# Patient Record
Sex: FEMALE | Race: WHITE | NOT HISPANIC OR LATINO
[De-identification: names, ages, dates, MRNs, and addresses within clinical notes are randomized per-mention and may not be internally consistent; named-entity substitution may affect disease eponyms.]

---

## 2017-02-20 ENCOUNTER — RECORDS - HEALTHEAST (OUTPATIENT)
Dept: ADMINISTRATIVE | Facility: OTHER | Age: 52
End: 2017-02-20

## 2017-04-12 ENCOUNTER — COMMUNICATION - HEALTHEAST (OUTPATIENT)
Dept: ADMINISTRATIVE | Facility: HOSPITAL | Age: 52
End: 2017-04-12

## 2017-04-20 ENCOUNTER — RECORDS - HEALTHEAST (OUTPATIENT)
Dept: ADMINISTRATIVE | Facility: OTHER | Age: 52
End: 2017-04-20

## 2017-04-21 ENCOUNTER — HOSPITAL ENCOUNTER (OUTPATIENT)
Dept: CT IMAGING | Facility: HOSPITAL | Age: 52
Setting detail: RADIATION/ONCOLOGY SERIES
Discharge: STILL ADMITTED OR ADMINISTRATIVE | End: 2017-04-21
Attending: INTERNAL MEDICINE

## 2017-04-21 DIAGNOSIS — C80.1 ADENOCARCINOID TUMOR (H): ICD-10-CM

## 2017-05-01 ENCOUNTER — OFFICE VISIT - HEALTHEAST (OUTPATIENT)
Dept: ONCOLOGY | Facility: CLINIC | Age: 52
End: 2017-05-01

## 2017-05-01 ENCOUNTER — AMBULATORY - HEALTHEAST (OUTPATIENT)
Dept: INFUSION THERAPY | Facility: CLINIC | Age: 52
End: 2017-05-01

## 2017-05-01 DIAGNOSIS — C80.1 ADENOCARCINOID TUMOR (H): ICD-10-CM

## 2017-05-01 ASSESSMENT — MIFFLIN-ST. JEOR: SCORE: 1350.2

## 2017-05-15 ENCOUNTER — AMBULATORY - HEALTHEAST (OUTPATIENT)
Dept: ONCOLOGY | Facility: CLINIC | Age: 52
End: 2017-05-15

## 2017-05-15 ENCOUNTER — COMMUNICATION - HEALTHEAST (OUTPATIENT)
Dept: ADMINISTRATIVE | Facility: HOSPITAL | Age: 52
End: 2017-05-15

## 2017-05-15 DIAGNOSIS — C80.1 ADENOCARCINOID TUMOR (H): ICD-10-CM

## 2017-05-19 ENCOUNTER — AMBULATORY - HEALTHEAST (OUTPATIENT)
Dept: INFUSION THERAPY | Facility: HOSPITAL | Age: 52
End: 2017-05-19

## 2017-05-19 DIAGNOSIS — C80.1 ADENOCARCINOID TUMOR (H): ICD-10-CM

## 2017-05-20 LAB — CHROMOGRANIN A, S - HISTORICAL: 86 NG/ML

## 2017-05-24 ENCOUNTER — COMMUNICATION - HEALTHEAST (OUTPATIENT)
Dept: ONCOLOGY | Facility: HOSPITAL | Age: 52
End: 2017-05-24

## 2017-11-01 ENCOUNTER — HOSPITAL ENCOUNTER (OUTPATIENT)
Dept: CT IMAGING | Facility: HOSPITAL | Age: 52
Discharge: HOME | End: 2017-11-01
Attending: INTERNAL MEDICINE

## 2017-11-01 ENCOUNTER — AMBULATORY - HEALTHEAST (OUTPATIENT)
Dept: ONCOLOGY | Facility: HOSPITAL | Age: 52
End: 2017-11-01

## 2017-11-01 DIAGNOSIS — C80.1 ADENOCARCINOID TUMOR (H): ICD-10-CM

## 2017-11-03 ENCOUNTER — OFFICE VISIT - HEALTHEAST (OUTPATIENT)
Dept: ONCOLOGY | Facility: HOSPITAL | Age: 52
End: 2017-11-03

## 2017-11-03 ENCOUNTER — AMBULATORY - HEALTHEAST (OUTPATIENT)
Dept: INFUSION THERAPY | Facility: HOSPITAL | Age: 52
End: 2017-11-03

## 2017-11-03 DIAGNOSIS — C80.1 ADENOCARCINOID TUMOR (H): ICD-10-CM

## 2017-11-06 ENCOUNTER — COMMUNICATION - HEALTHEAST (OUTPATIENT)
Dept: ONCOLOGY | Facility: HOSPITAL | Age: 52
End: 2017-11-06

## 2017-11-06 LAB — CHROMOGRANIN A, S - HISTORICAL: 109 NG/ML

## 2017-11-07 ENCOUNTER — COMMUNICATION - HEALTHEAST (OUTPATIENT)
Dept: ONCOLOGY | Facility: HOSPITAL | Age: 52
End: 2017-11-07

## 2017-11-07 DIAGNOSIS — C80.1 ADENOCARCINOID TUMOR (H): ICD-10-CM

## 2018-02-01 ENCOUNTER — COMMUNICATION - HEALTHEAST (OUTPATIENT)
Dept: ONCOLOGY | Facility: HOSPITAL | Age: 53
End: 2018-02-01

## 2018-02-22 ENCOUNTER — AMBULATORY - HEALTHEAST (OUTPATIENT)
Dept: INFUSION THERAPY | Facility: HOSPITAL | Age: 53
End: 2018-02-22

## 2018-02-22 DIAGNOSIS — C80.1 ADENOCARCINOID TUMOR (H): ICD-10-CM

## 2018-02-26 ENCOUNTER — COMMUNICATION - HEALTHEAST (OUTPATIENT)
Dept: ONCOLOGY | Facility: HOSPITAL | Age: 53
End: 2018-02-26

## 2018-02-26 LAB — CHROMOGRANIN A - HISTORICAL: 137 NG/ML (ref 0–95)

## 2018-02-27 ENCOUNTER — AMBULATORY - HEALTHEAST (OUTPATIENT)
Dept: ONCOLOGY | Facility: HOSPITAL | Age: 53
End: 2018-02-27

## 2018-02-27 DIAGNOSIS — C80.1 ADENOCARCINOID TUMOR (H): ICD-10-CM

## 2018-02-28 ENCOUNTER — COMMUNICATION - HEALTHEAST (OUTPATIENT)
Dept: ONCOLOGY | Facility: HOSPITAL | Age: 53
End: 2018-02-28

## 2018-03-01 ENCOUNTER — COMMUNICATION - HEALTHEAST (OUTPATIENT)
Dept: ONCOLOGY | Facility: HOSPITAL | Age: 53
End: 2018-03-01

## 2018-03-01 ENCOUNTER — HOSPITAL ENCOUNTER (OUTPATIENT)
Dept: CT IMAGING | Facility: HOSPITAL | Age: 53
Setting detail: RADIATION/ONCOLOGY SERIES
Discharge: STILL ADMITTED OR ADMINISTRATIVE | End: 2018-03-01
Attending: INTERNAL MEDICINE

## 2018-03-01 DIAGNOSIS — C80.1 ADENOCARCINOID TUMOR (H): ICD-10-CM

## 2018-03-09 ENCOUNTER — HOSPITAL ENCOUNTER (OUTPATIENT)
Dept: PET IMAGING | Facility: HOSPITAL | Age: 53
Setting detail: RADIATION/ONCOLOGY SERIES
Discharge: STILL ADMITTED OR ADMINISTRATIVE | End: 2018-03-09
Attending: INTERNAL MEDICINE

## 2018-03-09 ENCOUNTER — COMMUNICATION - HEALTHEAST (OUTPATIENT)
Dept: ONCOLOGY | Facility: HOSPITAL | Age: 53
End: 2018-03-09

## 2018-03-09 DIAGNOSIS — C80.1 ADENOCARCINOID TUMOR (H): ICD-10-CM

## 2018-03-09 LAB — GLUCOSE BLDC GLUCOMTR-MCNC: 82 MG/DL

## 2018-03-20 ENCOUNTER — COMMUNICATION - HEALTHEAST (OUTPATIENT)
Dept: ONCOLOGY | Facility: HOSPITAL | Age: 53
End: 2018-03-20

## 2018-05-01 ENCOUNTER — AMBULATORY - HEALTHEAST (OUTPATIENT)
Dept: INFUSION THERAPY | Facility: HOSPITAL | Age: 53
End: 2018-05-01

## 2018-05-01 DIAGNOSIS — C80.1 ADENOCARCINOID TUMOR (H): ICD-10-CM

## 2018-05-01 LAB
ALBUMIN SERPL-MCNC: 4 G/DL (ref 3.5–5)
ALP SERPL-CCNC: 65 U/L (ref 45–120)
ALT SERPL W P-5'-P-CCNC: 20 U/L (ref 0–45)
ANION GAP SERPL CALCULATED.3IONS-SCNC: 12 MMOL/L (ref 5–18)
AST SERPL W P-5'-P-CCNC: 23 U/L (ref 0–40)
BASOPHILS # BLD AUTO: 0 THOU/UL (ref 0–0.2)
BASOPHILS NFR BLD AUTO: 1 % (ref 0–2)
BILIRUB SERPL-MCNC: 0.4 MG/DL (ref 0–1)
BUN SERPL-MCNC: 18 MG/DL (ref 8–22)
CALCIUM SERPL-MCNC: 9.5 MG/DL (ref 8.5–10.5)
CHLORIDE BLD-SCNC: 105 MMOL/L (ref 98–107)
CO2 SERPL-SCNC: 23 MMOL/L (ref 22–31)
CREAT SERPL-MCNC: 0.76 MG/DL (ref 0.6–1.1)
EOSINOPHIL # BLD AUTO: 0.1 THOU/UL (ref 0–0.4)
EOSINOPHIL NFR BLD AUTO: 1 % (ref 0–6)
ERYTHROCYTE [DISTWIDTH] IN BLOOD BY AUTOMATED COUNT: 13.7 % (ref 11–14.5)
GFR SERPL CREATININE-BSD FRML MDRD: >60 ML/MIN/1.73M2
GLUCOSE BLD-MCNC: 109 MG/DL (ref 70–125)
HCT VFR BLD AUTO: 38.3 % (ref 35–47)
HGB BLD-MCNC: 13.6 G/DL (ref 12–16)
LYMPHOCYTES # BLD AUTO: 1.6 THOU/UL (ref 0.8–4.4)
LYMPHOCYTES NFR BLD AUTO: 32 % (ref 20–40)
MCH RBC QN AUTO: 33 PG (ref 27–34)
MCHC RBC AUTO-ENTMCNC: 35.5 G/DL (ref 32–36)
MCV RBC AUTO: 93 FL (ref 80–100)
MONOCYTES # BLD AUTO: 0.5 THOU/UL (ref 0–0.9)
MONOCYTES NFR BLD AUTO: 9 % (ref 2–10)
NEUTROPHILS # BLD AUTO: 3 THOU/UL (ref 2–7.7)
NEUTROPHILS NFR BLD AUTO: 57 % (ref 50–70)
PLATELET # BLD AUTO: 179 THOU/UL (ref 140–440)
PMV BLD AUTO: 12 FL (ref 8.5–12.5)
POTASSIUM BLD-SCNC: 4.1 MMOL/L (ref 3.5–5)
PROT SERPL-MCNC: 7.8 G/DL (ref 6–8)
RBC # BLD AUTO: 4.12 MILL/UL (ref 3.8–5.4)
SODIUM SERPL-SCNC: 140 MMOL/L (ref 136–145)
WBC: 5.2 THOU/UL (ref 4–11)

## 2018-05-04 ENCOUNTER — OFFICE VISIT - HEALTHEAST (OUTPATIENT)
Dept: ONCOLOGY | Facility: HOSPITAL | Age: 53
End: 2018-05-04

## 2018-05-04 DIAGNOSIS — C80.1 ADENOCARCINOID TUMOR (H): ICD-10-CM

## 2018-05-04 LAB — CHROMOGRANIN A - HISTORICAL: 103 NG/ML (ref 0–95)

## 2018-05-04 RX ORDER — MULTIPLE VITAMINS W/ MINERALS TAB 9MG-400MCG
2 TAB ORAL DAILY
Status: SHIPPED | COMMUNITY
Start: 2018-04-01

## 2018-05-07 ENCOUNTER — COMMUNICATION - HEALTHEAST (OUTPATIENT)
Dept: ONCOLOGY | Facility: HOSPITAL | Age: 53
End: 2018-05-07

## 2018-09-27 ENCOUNTER — COMMUNICATION - HEALTHEAST (OUTPATIENT)
Dept: ONCOLOGY | Facility: HOSPITAL | Age: 53
End: 2018-09-27

## 2018-10-08 ENCOUNTER — HOSPITAL ENCOUNTER (OUTPATIENT)
Dept: CT IMAGING | Facility: HOSPITAL | Age: 53
Setting detail: RADIATION/ONCOLOGY SERIES
Discharge: STILL ADMITTED OR ADMINISTRATIVE | End: 2018-10-08
Attending: INTERNAL MEDICINE

## 2018-10-08 DIAGNOSIS — C80.1 ADENOCARCINOID TUMOR (H): ICD-10-CM

## 2018-10-10 ENCOUNTER — AMBULATORY - HEALTHEAST (OUTPATIENT)
Dept: INFUSION THERAPY | Facility: HOSPITAL | Age: 53
End: 2018-10-10

## 2018-10-10 ENCOUNTER — OFFICE VISIT - HEALTHEAST (OUTPATIENT)
Dept: ONCOLOGY | Facility: HOSPITAL | Age: 53
End: 2018-10-10

## 2018-10-10 DIAGNOSIS — C80.1 ADENOCARCINOID TUMOR (H): ICD-10-CM

## 2018-10-10 DIAGNOSIS — D3A.00 CARCINOID TUMOR (H): ICD-10-CM

## 2018-10-10 LAB
ALBUMIN SERPL-MCNC: 4 G/DL (ref 3.5–5)
ALP SERPL-CCNC: 70 U/L (ref 45–120)
ALT SERPL W P-5'-P-CCNC: 23 U/L (ref 0–45)
ANION GAP SERPL CALCULATED.3IONS-SCNC: 11 MMOL/L (ref 5–18)
AST SERPL W P-5'-P-CCNC: 17 U/L (ref 0–40)
BASOPHILS # BLD AUTO: 0 THOU/UL (ref 0–0.2)
BASOPHILS NFR BLD AUTO: 1 % (ref 0–2)
BILIRUB SERPL-MCNC: 0.3 MG/DL (ref 0–1)
BUN SERPL-MCNC: 15 MG/DL (ref 8–22)
CALCIUM SERPL-MCNC: 9.7 MG/DL (ref 8.5–10.5)
CHLORIDE BLD-SCNC: 103 MMOL/L (ref 98–107)
CO2 SERPL-SCNC: 29 MMOL/L (ref 22–31)
CREAT SERPL-MCNC: 0.79 MG/DL (ref 0.6–1.1)
EOSINOPHIL # BLD AUTO: 0.1 THOU/UL (ref 0–0.4)
EOSINOPHIL NFR BLD AUTO: 3 % (ref 0–6)
ERYTHROCYTE [DISTWIDTH] IN BLOOD BY AUTOMATED COUNT: 13.2 % (ref 11–14.5)
GFR SERPL CREATININE-BSD FRML MDRD: >60 ML/MIN/1.73M2
GLUCOSE BLD-MCNC: 138 MG/DL (ref 70–125)
HCT VFR BLD AUTO: 42 % (ref 35–47)
HGB BLD-MCNC: 15 G/DL (ref 12–16)
LYMPHOCYTES # BLD AUTO: 1.6 THOU/UL (ref 0.8–4.4)
LYMPHOCYTES NFR BLD AUTO: 39 % (ref 20–40)
MCH RBC QN AUTO: 33.2 PG (ref 27–34)
MCHC RBC AUTO-ENTMCNC: 35.7 G/DL (ref 32–36)
MCV RBC AUTO: 93 FL (ref 80–100)
MONOCYTES # BLD AUTO: 0.3 THOU/UL (ref 0–0.9)
MONOCYTES NFR BLD AUTO: 7 % (ref 2–10)
NEUTROPHILS # BLD AUTO: 2.1 THOU/UL (ref 2–7.7)
NEUTROPHILS NFR BLD AUTO: 51 % (ref 50–70)
PLATELET # BLD AUTO: 136 THOU/UL (ref 140–440)
PMV BLD AUTO: 12.1 FL (ref 8.5–12.5)
POTASSIUM BLD-SCNC: 3.6 MMOL/L (ref 3.5–5)
PROT SERPL-MCNC: 7.5 G/DL (ref 6–8)
RBC # BLD AUTO: 4.52 MILL/UL (ref 3.8–5.4)
SODIUM SERPL-SCNC: 143 MMOL/L (ref 136–145)
WBC: 4.1 THOU/UL (ref 4–11)

## 2018-10-12 LAB — CHROMOGRANIN A - HISTORICAL: 129 NG/ML (ref 0–95)

## 2019-02-19 ENCOUNTER — COMMUNICATION - HEALTHEAST (OUTPATIENT)
Dept: ONCOLOGY | Facility: HOSPITAL | Age: 54
End: 2019-02-19

## 2019-03-12 ENCOUNTER — COMMUNICATION - HEALTHEAST (OUTPATIENT)
Dept: ONCOLOGY | Facility: HOSPITAL | Age: 54
End: 2019-03-12

## 2019-03-27 ENCOUNTER — COMMUNICATION - HEALTHEAST (OUTPATIENT)
Dept: ONCOLOGY | Facility: HOSPITAL | Age: 54
End: 2019-03-27

## 2019-04-24 ENCOUNTER — HOSPITAL ENCOUNTER (OUTPATIENT)
Dept: CT IMAGING | Facility: HOSPITAL | Age: 54
Setting detail: RADIATION/ONCOLOGY SERIES
Discharge: STILL ADMITTED OR ADMINISTRATIVE | End: 2019-04-24
Attending: INTERNAL MEDICINE

## 2019-04-24 DIAGNOSIS — D3A.00 CARCINOID TUMOR (H): ICD-10-CM

## 2019-04-26 ENCOUNTER — AMBULATORY - HEALTHEAST (OUTPATIENT)
Dept: INFUSION THERAPY | Facility: HOSPITAL | Age: 54
End: 2019-04-26

## 2019-04-26 ENCOUNTER — OFFICE VISIT - HEALTHEAST (OUTPATIENT)
Dept: ONCOLOGY | Facility: HOSPITAL | Age: 54
End: 2019-04-26

## 2019-04-26 DIAGNOSIS — C80.1 ADENOCARCINOID TUMOR (H): ICD-10-CM

## 2019-04-26 DIAGNOSIS — D3A.00 CARCINOID TUMOR (H): ICD-10-CM

## 2019-04-26 LAB
ALBUMIN SERPL-MCNC: 4.3 G/DL (ref 3.5–5)
ALP SERPL-CCNC: 60 U/L (ref 45–120)
ALT SERPL W P-5'-P-CCNC: 27 U/L (ref 0–45)
ANION GAP SERPL CALCULATED.3IONS-SCNC: 9 MMOL/L (ref 5–18)
AST SERPL W P-5'-P-CCNC: 18 U/L (ref 0–40)
BASOPHILS # BLD AUTO: 0 THOU/UL (ref 0–0.2)
BASOPHILS NFR BLD AUTO: 1 % (ref 0–2)
BILIRUB SERPL-MCNC: 0.4 MG/DL (ref 0–1)
BUN SERPL-MCNC: 17 MG/DL (ref 8–22)
CALCIUM SERPL-MCNC: 10.3 MG/DL (ref 8.5–10.5)
CHLORIDE BLD-SCNC: 105 MMOL/L (ref 98–107)
CO2 SERPL-SCNC: 26 MMOL/L (ref 22–31)
CREAT SERPL-MCNC: 0.79 MG/DL (ref 0.6–1.1)
EOSINOPHIL # BLD AUTO: 0.2 THOU/UL (ref 0–0.4)
EOSINOPHIL NFR BLD AUTO: 4 % (ref 0–6)
ERYTHROCYTE [DISTWIDTH] IN BLOOD BY AUTOMATED COUNT: 13 % (ref 11–14.5)
GFR SERPL CREATININE-BSD FRML MDRD: >60 ML/MIN/1.73M2
GLUCOSE BLD-MCNC: 97 MG/DL (ref 70–125)
HCT VFR BLD AUTO: 43.2 % (ref 35–47)
HGB BLD-MCNC: 15.2 G/DL (ref 12–16)
LYMPHOCYTES # BLD AUTO: 1.5 THOU/UL (ref 0.8–4.4)
LYMPHOCYTES NFR BLD AUTO: 35 % (ref 20–40)
MCH RBC QN AUTO: 32.1 PG (ref 27–34)
MCHC RBC AUTO-ENTMCNC: 35.2 G/DL (ref 32–36)
MCV RBC AUTO: 91 FL (ref 80–100)
MONOCYTES # BLD AUTO: 0.4 THOU/UL (ref 0–0.9)
MONOCYTES NFR BLD AUTO: 10 % (ref 2–10)
NEUTROPHILS # BLD AUTO: 2.2 THOU/UL (ref 2–7.7)
NEUTROPHILS NFR BLD AUTO: 51 % (ref 50–70)
PLATELET # BLD AUTO: 166 THOU/UL (ref 140–440)
PMV BLD AUTO: 12.1 FL (ref 8.5–12.5)
POTASSIUM BLD-SCNC: 4.3 MMOL/L (ref 3.5–5)
PROT SERPL-MCNC: 7.7 G/DL (ref 6–8)
RBC # BLD AUTO: 4.74 MILL/UL (ref 3.8–5.4)
SODIUM SERPL-SCNC: 140 MMOL/L (ref 136–145)
WBC: 4.2 THOU/UL (ref 4–11)

## 2019-10-30 ENCOUNTER — COMMUNICATION - HEALTHEAST (OUTPATIENT)
Dept: ADMINISTRATIVE | Facility: HOSPITAL | Age: 54
End: 2019-10-30

## 2019-12-02 ENCOUNTER — HOSPITAL ENCOUNTER (OUTPATIENT)
Dept: CT IMAGING | Facility: HOSPITAL | Age: 54
Discharge: HOME | End: 2019-12-02
Attending: INTERNAL MEDICINE

## 2019-12-02 DIAGNOSIS — C80.1 ADENOCARCINOID TUMOR (H): ICD-10-CM

## 2019-12-04 ENCOUNTER — OFFICE VISIT - HEALTHEAST (OUTPATIENT)
Dept: ONCOLOGY | Facility: HOSPITAL | Age: 54
End: 2019-12-04

## 2019-12-04 ENCOUNTER — AMBULATORY - HEALTHEAST (OUTPATIENT)
Dept: INFUSION THERAPY | Facility: HOSPITAL | Age: 54
End: 2019-12-04

## 2019-12-04 DIAGNOSIS — C80.1 ADENOCARCINOID TUMOR (H): ICD-10-CM

## 2019-12-04 LAB
ALBUMIN SERPL-MCNC: 4.1 G/DL (ref 3.5–5)
ALP SERPL-CCNC: 60 U/L (ref 45–120)
ALT SERPL W P-5'-P-CCNC: 27 U/L (ref 0–45)
ANION GAP SERPL CALCULATED.3IONS-SCNC: 7 MMOL/L (ref 5–18)
AST SERPL W P-5'-P-CCNC: 20 U/L (ref 0–40)
BASOPHILS # BLD AUTO: 0 THOU/UL (ref 0–0.2)
BASOPHILS NFR BLD AUTO: 0 % (ref 0–2)
BILIRUB SERPL-MCNC: 0.3 MG/DL (ref 0–1)
BUN SERPL-MCNC: 18 MG/DL (ref 8–22)
CALCIUM SERPL-MCNC: 9.6 MG/DL (ref 8.5–10.5)
CHLORIDE BLD-SCNC: 104 MMOL/L (ref 98–107)
CO2 SERPL-SCNC: 30 MMOL/L (ref 22–31)
CREAT SERPL-MCNC: 0.86 MG/DL (ref 0.6–1.1)
EOSINOPHIL # BLD AUTO: 0.2 THOU/UL (ref 0–0.4)
EOSINOPHIL NFR BLD AUTO: 3 % (ref 0–6)
ERYTHROCYTE [DISTWIDTH] IN BLOOD BY AUTOMATED COUNT: 13.7 % (ref 11–14.5)
GFR SERPL CREATININE-BSD FRML MDRD: >60 ML/MIN/1.73M2
GLUCOSE BLD-MCNC: 97 MG/DL (ref 70–125)
HCT VFR BLD AUTO: 41.2 % (ref 35–47)
HGB BLD-MCNC: 14.1 G/DL (ref 12–16)
LYMPHOCYTES # BLD AUTO: 1.6 THOU/UL (ref 0.8–4.4)
LYMPHOCYTES NFR BLD AUTO: 33 % (ref 20–40)
MCH RBC QN AUTO: 32.7 PG (ref 27–34)
MCHC RBC AUTO-ENTMCNC: 34.2 G/DL (ref 32–36)
MCV RBC AUTO: 96 FL (ref 80–100)
MONOCYTES # BLD AUTO: 0.6 THOU/UL (ref 0–0.9)
MONOCYTES NFR BLD AUTO: 13 % (ref 2–10)
NEUTROPHILS # BLD AUTO: 2.5 THOU/UL (ref 2–7.7)
NEUTROPHILS NFR BLD AUTO: 51 % (ref 50–70)
PLATELET # BLD AUTO: 161 THOU/UL (ref 140–440)
PMV BLD AUTO: 12 FL (ref 8.5–12.5)
POTASSIUM BLD-SCNC: 4.3 MMOL/L (ref 3.5–5)
PROT SERPL-MCNC: 7.6 G/DL (ref 6–8)
RBC # BLD AUTO: 4.31 MILL/UL (ref 3.8–5.4)
SODIUM SERPL-SCNC: 141 MMOL/L (ref 136–145)
WBC: 4.9 THOU/UL (ref 4–11)

## 2019-12-04 ASSESSMENT — MIFFLIN-ST. JEOR: SCORE: 1443.18

## 2020-04-20 ENCOUNTER — RECORDS - HEALTHEAST (OUTPATIENT)
Dept: ADMINISTRATIVE | Facility: OTHER | Age: 55
End: 2020-04-20

## 2020-08-10 ENCOUNTER — HOSPITAL ENCOUNTER (OUTPATIENT)
Dept: CT IMAGING | Facility: HOSPITAL | Age: 55
Discharge: HOME | End: 2020-08-10

## 2020-08-10 DIAGNOSIS — C80.1 ADENOCARCINOID TUMOR (H): ICD-10-CM

## 2020-08-12 ENCOUNTER — OFFICE VISIT - HEALTHEAST (OUTPATIENT)
Dept: ONCOLOGY | Facility: HOSPITAL | Age: 55
End: 2020-08-12

## 2020-08-12 ENCOUNTER — AMBULATORY - HEALTHEAST (OUTPATIENT)
Dept: INFUSION THERAPY | Facility: HOSPITAL | Age: 55
End: 2020-08-12

## 2020-08-12 DIAGNOSIS — C80.1 ADENOCARCINOID TUMOR (H): ICD-10-CM

## 2020-08-12 DIAGNOSIS — N93.9 VAGINAL BLEEDING: ICD-10-CM

## 2020-08-12 LAB
ALBUMIN SERPL-MCNC: 4 G/DL (ref 3.5–5)
ALP SERPL-CCNC: 61 U/L (ref 45–120)
ALT SERPL W P-5'-P-CCNC: 42 U/L (ref 0–45)
ANION GAP SERPL CALCULATED.3IONS-SCNC: 11 MMOL/L (ref 5–18)
AST SERPL W P-5'-P-CCNC: 30 U/L (ref 0–40)
BASOPHILS # BLD AUTO: 0 THOU/UL (ref 0–0.2)
BASOPHILS NFR BLD AUTO: 1 % (ref 0–2)
BILIRUB SERPL-MCNC: 0.4 MG/DL (ref 0–1)
BUN SERPL-MCNC: 16 MG/DL (ref 8–22)
CALCIUM SERPL-MCNC: 9.4 MG/DL (ref 8.5–10.5)
CHLORIDE BLD-SCNC: 102 MMOL/L (ref 98–107)
CO2 SERPL-SCNC: 29 MMOL/L (ref 22–31)
CREAT SERPL-MCNC: 0.84 MG/DL (ref 0.6–1.1)
EOSINOPHIL # BLD AUTO: 0.2 THOU/UL (ref 0–0.4)
EOSINOPHIL NFR BLD AUTO: 3 % (ref 0–6)
ERYTHROCYTE [DISTWIDTH] IN BLOOD BY AUTOMATED COUNT: 13.6 % (ref 11–14.5)
GFR SERPL CREATININE-BSD FRML MDRD: >60 ML/MIN/1.73M2
GLUCOSE BLD-MCNC: 120 MG/DL (ref 70–125)
HCT VFR BLD AUTO: 42.9 % (ref 35–47)
HGB BLD-MCNC: 14.5 G/DL (ref 12–16)
LYMPHOCYTES # BLD AUTO: 1.9 THOU/UL (ref 0.8–4.4)
LYMPHOCYTES NFR BLD AUTO: 36 % (ref 20–40)
MCH RBC QN AUTO: 32.5 PG (ref 27–34)
MCHC RBC AUTO-ENTMCNC: 33.8 G/DL (ref 32–36)
MCV RBC AUTO: 96 FL (ref 80–100)
MONOCYTES # BLD AUTO: 0.6 THOU/UL (ref 0–0.9)
MONOCYTES NFR BLD AUTO: 12 % (ref 2–10)
NEUTROPHILS # BLD AUTO: 2.7 THOU/UL (ref 2–7.7)
NEUTROPHILS NFR BLD AUTO: 49 % (ref 50–70)
PLATELET # BLD AUTO: 159 THOU/UL (ref 140–440)
PMV BLD AUTO: 12.2 FL (ref 8.5–12.5)
POTASSIUM BLD-SCNC: 4.1 MMOL/L (ref 3.5–5)
PROT SERPL-MCNC: 7.4 G/DL (ref 6–8)
RBC # BLD AUTO: 4.46 MILL/UL (ref 3.8–5.4)
SODIUM SERPL-SCNC: 142 MMOL/L (ref 136–145)
WBC: 5.5 THOU/UL (ref 4–11)

## 2020-08-12 RX ORDER — DIPHENHYDRAMINE HCL 25 MG
50 CAPSULE ORAL
Status: SHIPPED | COMMUNITY
Start: 2020-08-12

## 2020-08-12 RX ORDER — BIOTIN 10 MG
TABLET ORAL
Status: SHIPPED | COMMUNITY
Start: 2020-08-12

## 2020-08-12 RX ORDER — GABAPENTIN 300 MG/1
600 CAPSULE ORAL EVERY EVENING
Status: SHIPPED | COMMUNITY
Start: 2020-08-12

## 2021-05-28 NOTE — PROGRESS NOTES
[facility] Hematology and Oncology Progress Note    Patient: Hazel Johnson  MRN: [id number]  Date of Service:  April 26, 2019      Assessment and Plan:    1.  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  No evidence of recurrence.  Her labs look fine.  Clinically she is doing well with no evidence or symptoms of recurrence.  We will continue to see her every 6 months for another year.  She is now 4 years out from diagnosis.  After 5 years, will switch to  yearly imaging.  At that point I do not think we will continue to check the chromogranin A as it has not been a reliable marker of recurrence in her case.  She will let us know in the interim if she develops any new symptoms.    ECOG Performance   ECOG Performance Status: 0    Distress Assessment  Distress Assessment Score: 1    Pain  Currently in Pain: No/denies    Diagnosis:    1. 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 Tumor was 6.1 cm. Surgical margins were negative. One of 11 nodes was positive with adenocarcinoid. No evidence of metastatic disease on diagnosis.    Treatment:    Initial surgery was an appendectomy on March 4, 2015. Pathology showed adenocarcinoid.   She then went back for a right hemicolectomy on April 8, 2015. She did not receive any adjuvant treatment.    Interim History:    Hazel returns for a follow-up visit.  She was last seen about 6 months ago.  In the interim she is doing fine.  No changes in her health.  No acute complaints today.  Denies any abdominal pain, nausea, or vomiting.  No unintentional weight loss.    Review of Systems:    Constitutional  Constitutional (WDL): All constitutional elements are within defined limits  Neurosensory  Neurosensory (WDL): All neurosensory elements are within defined limits  Cardiovascular  Cardiovascular (WDL): All cardiovascular elements are within defined limits  Pulmonary  Respiratory (WDL): Within Defined  "Limits  Gastrointestinal  Gastrointestinal (WDL): All gastrointestinal elements are within defined limits  Genitourinary  Genitourinary (WDL): All genitourinary elements are within defined limits  Integumentary  Integumentary (WDL): All integumentary elements are within defined limits  Patient Coping  Patient Coping: Accepting  Accompanied by  Accompanied by: Alone    Past History:    Past Medical History:   Diagnosis Date     Appendicitis 03-     Cancer (H)     Appendix     GERD (gastroesophageal reflux disease)      History of gallstones      Neck pain     \"Pinched nerve\"     Pancreatitis     with gallstones     PONV (postoperative nausea and vomiting)     \"Severe nausea & vomiting\"     Physical Exam:    Recent Vitals 4/26/2019   Weight 185 lbs 8 oz   BSA (m2) 1.95 m2   /72   Pulse 66   Temp 98.6   Temp src 1   SpO2 94   Some recent data might be hidden     General: patient appears stated age of 54 y.o.. Nontoxic and in no distress.   HEENT: Head: atraumatic, normocephalic. Sclerae anicteric.  Chest:  Normal respiratory effort  Cardiac:  No edema.  Abdomen: abdomen is non-distended  Extremities: normal tone and muscle bulk.  Skin: no lesions or rash. Warm and dry.   CNS: alert and oriented. Grossly non-focal.   Psychiatric: normal mood and affect.     Lab Results:    Recent Results (from the past 168 hour(s))   Comprehensive Metabolic Panel   Result Value Ref Range    Sodium 140 136 - 145 mmol/L    Potassium 4.3 3.5 - 5.0 mmol/L    Chloride 105 98 - 107 mmol/L    CO2 26 22 - 31 mmol/L    Anion Gap, Calculation 9 5 - 18 mmol/L    Glucose 97 70 - 125 mg/dL    BUN 17 8 - 22 mg/dL    Creatinine 0.79 0.60 - 1.10 mg/dL    GFR MDRD Af Amer >60 >60 mL/min/1.73m2    GFR MDRD Non Af Amer >60 >60 mL/min/1.73m2    Bilirubin, Total 0.4 0.0 - 1.0 mg/dL    Calcium 10.3 8.5 - 10.5 mg/dL    Protein, Total 7.7 6.0 - 8.0 g/dL    Albumin 4.3 3.5 - 5.0 g/dL    Alkaline Phosphatase 60 45 - 120 U/L    AST 18 0 - 40 U/L    " ALT 27 0 - 45 U/L   HM1 (CBC with Diff)   Result Value Ref Range    WBC 4.2 4.0 - 11.0 thou/uL    RBC 4.74 3.80 - 5.40 mill/uL    Hemoglobin 15.2 12.0 - 16.0 g/dL    Hematocrit 43.2 35.0 - 47.0 %    MCV 91 80 - 100 fL    MCH 32.1 27.0 - 34.0 pg    MCHC 35.2 32.0 - 36.0 g/dL    RDW 13.0 11.0 - 14.5 %    Platelets 166 140 - 440 thou/uL    MPV 12.1 8.5 - 12.5 fL    Neutrophils % 51 50 - 70 %    Lymphocytes % 35 20 - 40 %    Monocytes % 10 2 - 10 %    Eosinophils % 4 0 - 6 %    Basophils % 1 0 - 2 %    Neutrophils Absolute 2.2 2.0 - 7.7 thou/uL    Lymphocytes Absolute 1.5 0.8 - 4.4 thou/uL    Monocytes Absolute 0.4 0.0 - 0.9 thou/uL    Eosinophils Absolute 0.2 0.0 - 0.4 thou/uL    Basophils Absolute 0.0 0.0 - 0.2 thou/uL     Imaging:    We reviewed her CT scan images.  No evidence of recurrence.    EXAM: CT CHEST ABDOMEN PELVIS WO ORAL W IV CONTRAST  LOCATION: [facility]  DATE/TIME: 4/24/2019 11:49 AM     FINDINGS:   CHEST: No pulmonary nodules. No lymphadenopathy. There is suspected wall thickening of the lower esophagus.     ABDOMEN: Normal spleen, pancreas, and adrenal glands. There is hepatic steatosis. Status post cholecystectomy. Normal kidneys and ureters. Normal stomach. Normal caliber of the small bowel. No abdominal lymphadenopathy.      PELVIS: Normal urinary bladder. Normal uterus. No adnexal masses. Status post right hemicolectomy with ileocolic anastomosis. No new soft tissue near the suture line. No pelvic lymphadenopathy.      MUSCULOSKELETAL: Postsurgical change along the ventral pelvic wall.      CONCLUSION:  1.  Right hemicolectomy and ileocolic anastomosis. No new lymphadenopathy in the chest, abdomen, or pelvis. No new pulmonary nodules.  2.  Hepatic steatosis.       Signed by: Marvin Cummins MD

## 2021-05-30 VITALS — WEIGHT: 170.9 LBS | HEIGHT: 64 IN | BODY MASS INDEX: 29.18 KG/M2

## 2021-05-31 VITALS — BODY MASS INDEX: 30.95 KG/M2 | WEIGHT: 180.3 LBS

## 2021-06-01 VITALS — BODY MASS INDEX: 31.58 KG/M2 | WEIGHT: 184 LBS

## 2021-06-01 VITALS — BODY MASS INDEX: 30.42 KG/M2 | WEIGHT: 177.2 LBS

## 2021-06-02 VITALS — WEIGHT: 181 LBS | BODY MASS INDEX: 31.07 KG/M2

## 2021-06-03 VITALS — WEIGHT: 185.5 LBS | BODY MASS INDEX: 31.84 KG/M2

## 2021-06-04 VITALS
HEART RATE: 75 BPM | DIASTOLIC BLOOD PRESSURE: 79 MMHG | WEIGHT: 195 LBS | BODY MASS INDEX: 33.47 KG/M2 | OXYGEN SATURATION: 96 % | TEMPERATURE: 97.5 F | SYSTOLIC BLOOD PRESSURE: 136 MMHG

## 2021-06-04 VITALS
SYSTOLIC BLOOD PRESSURE: 131 MMHG | BODY MASS INDEX: 32.68 KG/M2 | HEART RATE: 80 BPM | OXYGEN SATURATION: 93 % | WEIGHT: 191.4 LBS | DIASTOLIC BLOOD PRESSURE: 85 MMHG | HEIGHT: 64 IN

## 2021-06-04 NOTE — PROGRESS NOTES
[facility] Hematology and Oncology Progress Note    Patient: Hazel Johnson  MRN: [id number]  Date of Service: 12/04/2019        Reason for Visit    Chief Complaint   Patient presents with     HE Cancer     Adenocarcinoid tumor        Assessment and Plan    1.  Adeno carcinoid tumor of the appendix: Patient is almost 5 years out from her diagnosis.  She had surgery in March 2015.  She states she is doing quite well.  Her CT scan shows no evidence of disease recurrence.  We will have her do one more six-month follow-up with a scan.  She will then switch to annual visits.  Dr. Cummins is no longer checking chromogranin A levels because it has not been a reliable marker in the past.  If anything shows up on the scan we will likely recheck that.  She will continue to call us with any changes in her overall health.    ECOG Performance   ECOG Performance Status: 0     Distress Assessment  Distress Assessment Score: No distress    Pain  Currently in Pain: No/denies        Problem List    No diagnosis found.     ______________________________________________________________________________    History of Present Illness    Diagnosis:     1. 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 Tumor was 6.1 cm. Surgical margins were negative. One of 11 nodes was positive with adenocarcinoid. No evidence of metastatic disease on diagnosis.     Treatment:     Initial surgery was an appendectomy on March 4, 2015. Pathology showed adenocarcinoid.   She then went back for a right hemicolectomy on April 8, 2015. She did not receive any adjuvant treatment.     Interim History:     Hazel returns for a follow-up visit.   Patient is doing well and really has no complaints.  Her bowels are normal.  No unexplained weight loss.    Pain Status  Currently in Pain: No/denies    Review of Systems    Oncology Nurse Assessment/CMA Intake: Constitutional  Constitutional (WDL): All constitutional elements are within defined  "limits  Neurosensory  Neurosensory (WDL): All neurosensory elements are within defined limits  Eye   Eye Disorder (WDL): All eye disorder elements are within defined limits  Ear  Ear Disorder (WDL): All ear disorder elements are within defined limits  Cardiovascular  Cardiovascular (WDL): All cardiovascular elements are within defined limits  Pulmonary  Respiratory (WDL): Within Defined Limits  Gastrointestinal  Gastrointestinal (WDL): All gastrointestinal elements are within defined limits  Genitourinary  Genitourinary (WDL): All genitourinary elements are within defined limits  Lymphatic  Lymph (WDL): All lymph disorder elements are within defined limits  Musculoskeletal and Connective Tissue  Musculoskeletal and Connetive Tissue Disorders (WDL): All Musculoskeletal and Connetive Tissue Disorder elements are within defined limits  Integumentary  Integumentary (WDL): All integumentary elements are within defined limits  Patient Coping  Patient Coping: Accepting;Open/discussion  Accompanied by  Accompanied by: Alone  Oral Chemo Adherence         Past History  Past Medical History:   Diagnosis Date     Appendicitis 03-     Cancer (H)     Appendix     GERD (gastroesophageal reflux disease)      History of gallstones      Neck pain     \"Pinched nerve\"     Pancreatitis     with gallstones     PONV (postoperative nausea and vomiting)     \"Severe nausea & vomiting\"       PHYSICAL EXAM:  /85   Pulse 80   Ht 5' 4\" (1.626 m)   Wt 191 lb 6.4 oz (86.8 kg)   SpO2 93%   BMI 32.85 kg/m    GENERAL: no acute distress. Cooperative in conversation. Here alone  HEENT: pupils are equal, round and reactive. Oromucosa is clean and intact. No ulcerations or mucositis noted. No bleeding noted.  RESP: lungs are clear bilaterally per auscultation. Regular respiratory rate. No wheezes or rhonchi.  CV: Regular, rate and rhythm. No murmurs.  ABD: soft, nontender. No organomegaly.   MUSCULOSKELETAL: No lower extremity swelling. "   NEURO: non focal. Alert and oriented x3.   PSYCH: within normal limits. No depression or anxiety.  SKIN: warm dry intact   LYMPH: no cervical, supraclavicular or axillary lymphadenopathy    Lab Results    Recent Results (from the past 168 hour(s))   Comprehensive Metabolic Panel   Result Value Ref Range    Sodium 141 136 - 145 mmol/L    Potassium 4.3 3.5 - 5.0 mmol/L    Chloride 104 98 - 107 mmol/L    CO2 30 22 - 31 mmol/L    Anion Gap, Calculation 7 5 - 18 mmol/L    Glucose 97 70 - 125 mg/dL    BUN 18 8 - 22 mg/dL    Creatinine 0.86 0.60 - 1.10 mg/dL    GFR MDRD Af Amer >60 >60 mL/min/1.73m2    GFR MDRD Non Af Amer >60 >60 mL/min/1.73m2    Bilirubin, Total 0.3 0.0 - 1.0 mg/dL    Calcium 9.6 8.5 - 10.5 mg/dL    Protein, Total 7.6 6.0 - 8.0 g/dL    Albumin 4.1 3.5 - 5.0 g/dL    Alkaline Phosphatase 60 45 - 120 U/L    AST 20 0 - 40 U/L    ALT 27 0 - 45 U/L   HM1 (CBC with Diff)   Result Value Ref Range    WBC 4.9 4.0 - 11.0 thou/uL    RBC 4.31 3.80 - 5.40 mill/uL    Hemoglobin 14.1 12.0 - 16.0 g/dL    Hematocrit 41.2 35.0 - 47.0 %    MCV 96 80 - 100 fL    MCH 32.7 27.0 - 34.0 pg    MCHC 34.2 32.0 - 36.0 g/dL    RDW 13.7 11.0 - 14.5 %    Platelets 161 140 - 440 thou/uL    MPV 12.0 8.5 - 12.5 fL    Neutrophils % 51 50 - 70 %    Lymphocytes % 33 20 - 40 %    Monocytes % 13 (H) 2 - 10 %    Eosinophils % 3 0 - 6 %    Basophils % 0 0 - 2 %    Neutrophils Absolute 2.5 2.0 - 7.7 thou/uL    Lymphocytes Absolute 1.6 0.8 - 4.4 thou/uL    Monocytes Absolute 0.6 0.0 - 0.9 thou/uL    Eosinophils Absolute 0.2 0.0 - 0.4 thou/uL    Basophils Absolute 0.0 0.0 - 0.2 thou/uL       Imaging    Ct Chest Abdomen Pelvis Without Oral With Iv Contrast    Result Date: 12/2/2019  EXAM: CT CHEST ABDOMEN PELVIS WO ORAL W IV CONTRAST LOCATION:  DATE/TIME: 12/2/2019 1:22 PM INDICATION: appendiceal carcinoid follow up COMPARISON: 04/24/2019 TECHNIQUE: CT scan of the chest, abdomen, and pelvis was performed following injection of  IV contrast. Multiplanar reformats were obtained. Dose reduction techniques were used. CONTRAST: Iohexol (Omni) 100 mL FINDINGS: LUNGS AND PLEURA: Normal. MEDIASTINUM/AXILLAE: Normal. HEPATOBILIARY: Fatty liver. Cholecystectomy. PANCREAS: Normal. SPLEEN: Normal. ADRENAL GLANDS: Normal. KIDNEYS/BLADDER: Normal. BOWEL: Partial right hemicolectomy with ileocolonic anastomosis. LYMPH NODES: Normal. VASCULATURE: Unremarkable. PELVIC ORGANS: Normal. OTHER: Stable small fat-containing ventral hernia. MUSCULOSKELETAL: Normal.     1.  Right hemicolectomy with ileocolonic anastomosis. No evidence for recurrence. 2.  Fatty liver.        Signed by: Leah Shepard, CNP

## 2021-06-10 NOTE — PROGRESS NOTES
[facility] Hematology and Oncology Progress Note    Patient: Hazel Johnson  MRN: [id number]  Date of Service:        Assessment and Plan:    1.  Adenocarcinoid tumor of the appendix: Imaging looks okay with no evidence of recurrent disease.  Clinically she is asymptomatic.  She is now 2 years out from her diagnosis and treatment.  We will see her back in 6 months with repeat scan.  Chromogranin A from today is pending.  She will let us know if she has any symptoms.    2.  Left adnexal mass: Stable over a year and a half.  Continue to monitor.    ECOG Performance   ECOG Performance Status: 0    Distress Assessment  Distress Assessment Score: No distress    Pain  Currently in Pain: No/denies    Diagnosis:    1. 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 Tumor was 6.1 cm. Surgical margins were negative. One of 11 nodes was positive with adenocarcinoid. No evidence of metastatic disease on diagnosis.    Treatment:    Initial surgery was an appendectomy on March 4, 2015. Pathology showed adenocarcinoid. She then went back for a right hemicolectomy on April 8, 2015. She did not receive any adjuvant treatment.    Interim History:    Hazel returns for a six-month follow-up visit.  She is doing well.  She has no pain.  No complaints today.  Energy and appetite are good.  No change in bowel or bladder habits.  No abdominal discomfort.    Review of Systems:    Constitutional  Constitutional (WDL): All constitutional elements are within defined limits  Neurosensory  Neurosensory (WDL): All neurosensory elements are within defined limits  Cardiovascular  Cardiovascular (WDL): All cardiovascular elements are within defined limits  Pulmonary  Respiratory (WDL): Within Defined Limits  Gastrointestinal  Gastrointestinal (WDL): All gastrointestinal elements are within defined limits  Genitourinary  Genitourinary (WDL): All genitourinary elements are within defined limits  Integumentary  Integumentary  "(WDL): All integumentary elements are within defined limits  Patient Coping  Patient Coping: Accepting  Accompanied by  Accompanied by: Alone    Past History:    Past Medical History:   Diagnosis Date     Appendicitis 03-     Cancer     Appendix     GERD (gastroesophageal reflux disease)      History of gallstones      Neck pain     \"Pinched nerve\"     Pancreatitis     with gallstones     PONV (postoperative nausea and vomiting)     \"Severe nausea & vomiting\"     Physical Exam:    Recent Vitals 5/1/2017   Height 5' 4\"   Weight 170 lbs 14 oz   BSA (m2) 1.87 m2   /72   Pulse 70   Temp -   Temp src -   SpO2 97     General: patient appears stated age of 52 y.o.. Nontoxic and in no distress.   HEENT: Head: atraumatic, normocephalic. Sclerae anicteric.  Chest:  Normal respiratory effort  Cardiac:  No edema.   Abdomen: abdomen is soft, non-distended  Extremities: normal tone and muscle bulk.  Skin: no lesions or rash. Warm and dry.   CNS: alert and oriented x3. Grossly non-focal.   Psychiatric: normal mood and affect.     Lab Results:    Recent Results (from the past 168 hour(s))   Comprehensive Metabolic Panel   Result Value Ref Range    Sodium 141 136 - 145 mmol/L    Potassium 4.4 3.5 - 5.0 mmol/L    Chloride 103 98 - 107 mmol/L    CO2 27 22 - 31 mmol/L    Anion Gap, Calculation 11 5 - 18 mmol/L    Glucose 119 70 - 125 mg/dL    BUN 20 8 - 22 mg/dL    Creatinine 0.81 0.60 - 1.10 mg/dL    GFR MDRD Af Amer >60 >60 mL/min/1.73m2    GFR MDRD Non Af Amer >60 >60 mL/min/1.73m2    Bilirubin, Total 0.2 0.0 - 1.0 mg/dL    Calcium 9.9 8.5 - 10.5 mg/dL    Protein, Total 7.6 6.0 - 8.0 g/dL    Albumin 3.9 3.5 - 5.0 g/dL    Alkaline Phosphatase 42 (L) 45 - 120 U/L    AST 15 0 - 40 U/L    ALT 11 0 - 45 U/L   HM1 (CBC with Diff)   Result Value Ref Range    WBC 7.1 4.0 - 11.0 thou/uL    RBC 4.04 3.80 - 5.40 mill/uL    Hemoglobin 13.3 12.0 - 16.0 g/dL    Hematocrit 38.8 35.0 - 47.0 %    MCV 96 80 - 100 fL    MCH 32.9 27.0 - " 34.0 pg    MCHC 34.3 32.0 - 36.0 g/dL    RDW 13.9 11.0 - 14.5 %    Platelets 184 140 - 440 thou/uL    MPV 11.8 8.5 - 12.5 fL    Neutrophils % 64 50 - 70 %    Lymphocytes % 27 20 - 40 %    Monocytes % 8 2 - 10 %    Eosinophils % 1 0 - 6 %    Basophils % 0 0 - 2 %    Neutrophils Absolute 4.5 2.0 - 7.7 thou/uL    Lymphocytes Absolute 1.9 0.8 - 4.4 thou/uL    Monocytes Absolute 0.6 0.0 - 0.9 thou/uL    Eosinophils Absolute 0.1 0.0 - 0.4 thou/uL    Basophils Absolute 0.0 0.0 - 0.2 thou/uL     Imaging:    CAT scan images were personally reviewed.  Stable left adnexal mass.  No evidence of recurrent adenocarcinoid.    Ct Chest Abdomen Pelvis With Oral With Iv Cont    Result Date: 4/21/2017  CT CHEST, ABDOMEN, AND PELVIS W ORAL W IV CONTRAST 4/21/2017 3:14 PM      INDICATION: Colon cancer follow-up. TECHNIQUE: CT chest, abdomen, and pelvis. Dose reduction techniques were used. IV CONTRAST: Iohexol (Omni) 100 mL. COMPARISON: CT abdomen and pelvis 10/12/2015; CT chest, abdomen, and pelvis 7/11/2016. FINDINGS: CHEST: The lungs are clear. No axillary or mediastinal adenopathy is seen.  ABDOMEN: There is fatty infiltration of the liver. The patient has had a cholecystectomy. The spleen, adrenals, and pancreas are unremarkable. The kidneys appear unremarkable. No retroperitoneal adenopathy is seen. PELVIS: Patient has an ileocolic anastomosis. There is a 3.4 cm left ovarian or adnexal cyst not seen on the prior study. No adenopathy is seen. MUSCULOSKELETAL: There is a small umbilical hernia. There are degenerative changes in spine.     CONCLUSION: 1.  No evidence for recurrent tumor seen in the chest, abdomen, or pelvis. 2.  There is a 3.4 cm left ovarian or adnexal cyst not seen on the prior study.      Signed by: Marvin Cummins MD

## 2021-06-10 NOTE — PROGRESS NOTES
[facility] Hematology and Oncology Progress Note    Patient: Hazel Johnson  MRN: [id number]  Date of Service: August 12, 2020      Assessment and Plan:    1.  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  No evidence of recurrence.  Clinically she is doing well with no gastrointestinal symptoms.  She is now about 5 and half years out from her diagnosis.  I told her she is probably cured but will continue to mature yearly until she is 10 years out.  Questions were answered.  Follow-up in 1 year.    2.  Vaginal spotting: Very slight.  Seen on telemetry.  Unclear from vagina or bladder.  She does have a history of a sling.  I asked her to follow-up with her gynecologist for complete examination.  No evidence of any fibroid or any obvious abnormality on her CT scan.     ECOG Performance   ECOG Performance Status: 0    Distress Assessment  Distress Assessment Score: 1    Pain  Currently in Pain: No/denies    Diagnosis:    1. 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 Tumor was 6.1 cm. Surgical margins were negative. One of 11 nodes was positive with adenocarcinoid. No evidence of metastatic disease on diagnosis.    Treatment:    Initial surgery was an appendectomy on March 4, 2015. Pathology showed adenocarcinoid.   She then went back for a right hemicolectomy on April 8, 2015. She did not receive any adjuvant treatment.    Interim History:    Hazel returns for a follow-up visit.  She was last seen about 8 months ago.  In the interim she is been doing fine.  No abdominal pain, nausea, vomiting.  She has noted that she is had some very very slight vaginal spotting.  Last menstrual period 3 years ago.    Review of Systems:    Constitutional  Constitutional (WDL): All constitutional elements are within defined limits  Neurosensory  Neurosensory (WDL): Exceptions to WDL(restless legs)  Cardiovascular  Cardiovascular (WDL): All cardiovascular elements are within defined  "limits  Pulmonary  Respiratory (WDL): Within Defined Limits  Gastrointestinal  Gastrointestinal (WDL): All gastrointestinal elements are within defined limits  Genitourinary  Genitourinary (WDL): All genitourinary elements are within defined limits  Integumentary  Integumentary (WDL): All integumentary elements are within defined limits  Patient Coping  Patient Coping: Accepting  Accompanied by  Accompanied by: Alone    Past History:    Past Medical History:   Diagnosis Date     Appendicitis 03-     Cancer (H)     Appendix     GERD (gastroesophageal reflux disease)      History of gallstones      Neck pain     \"Pinched nerve\"     Pancreatitis     with gallstones     PONV (postoperative nausea and vomiting)     \"Severe nausea & vomiting\"     Physical Exam:    Recent Vitals 8/12/2020   Height -   Weight 195 lbs   BSA (m2) 2 m2   /79   Pulse 75   Temp 97.5   Temp src 1   SpO2 96   Some recent data might be hidden     General: patient appears stated age of 55 y.o.. Nontoxic and in no distress.   HEENT: Head: atraumatic, normocephalic. Sclerae anicteric.  Chest:  Normal respiratory effort  Cardiac:  No edema.  Abdomen: abdomen is soft, non-distended  Extremities: normal tone and muscle bulk.  Skin: no lesions or rash. Warm and dry.   CNS: alert and oriented. Grossly non-focal.   Psychiatric: normal mood and affect.     Lab Results:    Recent Results (from the past 168 hour(s))   Comprehensive Metabolic Panel   Result Value Ref Range    Sodium 142 136 - 145 mmol/L    Potassium 4.1 3.5 - 5.0 mmol/L    Chloride 102 98 - 107 mmol/L    CO2 29 22 - 31 mmol/L    Anion Gap, Calculation 11 5 - 18 mmol/L    Glucose 120 70 - 125 mg/dL    BUN 16 8 - 22 mg/dL    Creatinine 0.84 0.60 - 1.10 mg/dL    GFR MDRD Af Amer >60 >60 mL/min/1.73m2    GFR MDRD Non Af Amer >60 >60 mL/min/1.73m2    Bilirubin, Total 0.4 0.0 - 1.0 mg/dL    Calcium 9.4 8.5 - 10.5 mg/dL    Protein, Total 7.4 6.0 - 8.0 g/dL    Albumin 4.0 3.5 - 5.0 g/dL    " Alkaline Phosphatase 61 45 - 120 U/L    AST 30 0 - 40 U/L    ALT 42 0 - 45 U/L   HM1 (CBC with Diff)   Result Value Ref Range    WBC 5.5 4.0 - 11.0 thou/uL    RBC 4.46 3.80 - 5.40 mill/uL    Hemoglobin 14.5 12.0 - 16.0 g/dL    Hematocrit 42.9 35.0 - 47.0 %    MCV 96 80 - 100 fL    MCH 32.5 27.0 - 34.0 pg    MCHC 33.8 32.0 - 36.0 g/dL    RDW 13.6 11.0 - 14.5 %    Platelets 159 140 - 440 thou/uL    MPV 12.2 8.5 - 12.5 fL    Neutrophils % 49 (L) 50 - 70 %    Lymphocytes % 36 20 - 40 %    Monocytes % 12 (H) 2 - 10 %    Eosinophils % 3 0 - 6 %    Basophils % 1 0 - 2 %    Neutrophils Absolute 2.7 2.0 - 7.7 thou/uL    Lymphocytes Absolute 1.9 0.8 - 4.4 thou/uL    Monocytes Absolute 0.6 0.0 - 0.9 thou/uL    Eosinophils Absolute 0.2 0.0 - 0.4 thou/uL    Basophils Absolute 0.0 0.0 - 0.2 thou/uL     Imaging:    We reviewed her CT scan images.  No evidence of recurrence.    EXAM: CT CHEST ABDOMEN PELVIS WO ORAL W IV CONTRAST  LOCATION: [facility]  DATE/TIME: 8/10/2020 2:46 PM    FINDINGS:   LUNGS AND PLEURA: Normal.     MEDIASTINUM/AXILLAE: Normal.     HEPATOBILIARY: Moderate diffuse hepatic steatosis unchanged.     PANCREAS: Normal.     SPLEEN: Normal.     ADRENAL GLANDS: Normal.     KIDNEYS/BLADDER: Normal.     BOWEL: Postop right hemicolectomy with ileal ascending colostomy. Bowel loops otherwise normal. Nothing concerning for local recurrence.     LYMPH NODES: No lymphadenopathy.     VASCULATURE: Mild calcified plaque.     PELVIC ORGANS: Normal.     MUSCULOSKELETAL: No concerning bone lesion. Moderate degenerative change.     IMPRESSION:   1.  Postop right hemicolectomy with ileal ascending colostomy.     2.  Nothing for local tumor recurrence or metastatic disease in the chest, abdomen, or pelvis.     3.  No significant change since 12/02/2019.      Signed by: Marvin Cummins MD

## 2021-06-14 NOTE — PROGRESS NOTES
[facility] Hematology and Oncology Progress Note    Patient: Hazel Johnson  MRN: [id number]  Date of Service:  November 3, 2017      Assessment and Plan:    1.  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  No evidence of recurrent disease in the abdomen or pelvis.  Clinically she is doing well with no symptoms.  Chromogranin A is up slightly.  We will repeat labs only in 3 months.  Imaging to be repeated in 6 months.    2.  Left adnexal mass: Stable over a year and a half.  Continue to monitor.    ECOG Performance   ECOG Performance Status: 0    Distress Assessment  Distress Assessment Score: 1    Pain  Currently in Pain: No/denies    Diagnosis:    1. 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 Tumor was 6.1 cm. Surgical margins were negative. One of 11 nodes was positive with adenocarcinoid. No evidence of metastatic disease on diagnosis.    Treatment:    Initial surgery was an appendectomy on March 4, 2015. Pathology showed adenocarcinoid. She then went back for a right hemicolectomy on April 8, 2015. She did not receive any adjuvant treatment.    Interim History:    Hazel returns for a six-month follow-up visit.  Overall she is doing okay.  She has no acute complaints today.  No abdominal pain.  No nausea vomiting or change in bowel or bladder habits.    Review of Systems:    Constitutional  Fatigue: None  Fever: None  Chills: None  Weight Gain: 5 - <10% from baseline  Weight Loss: None  Neurosensory  Peripheral Motor Neuropathy: None  Ataxia: None  Peripheral Sensory Neuropathy: None  Confusion: None  Syncope: None  Cardiovascular  Cardiovascular (WDL): All cardiovascular elements are within defined limits  Pulmonary  Respiratory (WDL): Within Defined Limits  Gastrointestinal  Anorexia: None  Constipation: None  Diarrhea: None  Dysphagia: None  Esophagitis: None  Nausea: None  Pharyngitis: None  Vomiting: None  Dysgeusia: None  Dry Mouth:  "None  Genitourinary  Genitourinary (WDL): All genitourinary elements are within defined limits  Integumentary  Integumentary (WDL): All integumentary elements are within defined limits  Patient Coping  Patient Coping: Accepting  Accompanied by  Accompanied by: Alone    Past History:    Past Medical History:   Diagnosis Date     Appendicitis 03-     Cancer     Appendix     GERD (gastroesophageal reflux disease)      History of gallstones      Neck pain     \"Pinched nerve\"     Pancreatitis     with gallstones     PONV (postoperative nausea and vomiting)     \"Severe nausea & vomiting\"     Physical Exam:    Recent Vitals 11/3/2017   Height -   Weight 180 lbs 5 oz   BSA (m2) -   /78   Pulse 68   Temp 98   Temp src 1   SpO2 95   Some recent data might be hidden     General: patient appears stated age of 52 y.o.. Nontoxic and in no distress.   HEENT: Head: atraumatic, normocephalic. Sclerae anicteric.  Chest:  Normal respiratory effort  Cardiac:  No edema.   Abdomen: abdomen is non-distended  Extremities: normal tone and muscle bulk.  Skin: no lesions or rash. Warm and dry.   CNS: alert and oriented x3. Grossly non-focal.   Psychiatric: normal mood and affect.     Lab Results:    Recent Results (from the past 168 hour(s))   Chromogranin A   Result Value Ref Range    Chromogranin A 109 (H) <93 ng/mL   Comprehensive Metabolic Panel   Result Value Ref Range    Sodium 141 136 - 145 mmol/L    Potassium 3.8 3.5 - 5.0 mmol/L    Chloride 102 98 - 107 mmol/L    CO2 29 22 - 31 mmol/L    Anion Gap, Calculation 10 5 - 18 mmol/L    Glucose 124 70 - 125 mg/dL    BUN 19 8 - 22 mg/dL    Creatinine 0.75 0.60 - 1.10 mg/dL    GFR MDRD Af Amer >60 >60 mL/min/1.73m2    GFR MDRD Non Af Amer >60 >60 mL/min/1.73m2    Bilirubin, Total 0.4 0.0 - 1.0 mg/dL    Calcium 9.6 8.5 - 10.5 mg/dL    Protein, Total 7.5 6.0 - 8.0 g/dL    Albumin 3.7 3.5 - 5.0 g/dL    Alkaline Phosphatase 59 45 - 120 U/L    AST 16 0 - 40 U/L    ALT 18 0 - 45 U/L "   HM1 (CBC with Diff)   Result Value Ref Range    WBC 4.6 4.0 - 11.0 thou/uL    RBC 4.10 3.80 - 5.40 mill/uL    Hemoglobin 13.5 12.0 - 16.0 g/dL    Hematocrit 38.8 35.0 - 47.0 %    MCV 95 80 - 100 fL    MCH 32.9 27.0 - 34.0 pg    MCHC 34.8 32.0 - 36.0 g/dL    RDW 14.6 (H) 11.0 - 14.5 %    Platelets 157 140 - 440 thou/uL    MPV 12.3 8.5 - 12.5 fL    Neutrophils % 55 50 - 70 %    Lymphocytes % 32 20 - 40 %    Monocytes % 10 2 - 10 %    Eosinophils % 3 0 - 6 %    Basophils % 0 0 - 2 %    Neutrophils Absolute 2.5 2.0 - 7.7 thou/uL    Lymphocytes Absolute 1.5 0.8 - 4.4 thou/uL    Monocytes Absolute 0.5 0.0 - 0.9 thou/uL    Eosinophils Absolute 0.1 0.0 - 0.4 thou/uL    Basophils Absolute 0.0 0.0 - 0.2 thou/uL     Imaging:    CAT scan images were personally reviewed.  No evidence of recurrent disease.    Ct Chest Abdomen Pelvis With Oral With Iv Cont    Result Date: 11/1/2017  CT CHEST, ABDOMEN, AND PELVIS W ORAL W IV CONTRAST 11/1/2017 3:02 PM      INDICATION: Adenocarcinoid appendix follow-up. TECHNIQUE: CT chest, abdomen, and pelvis. Dose reduction techniques were used. IV CONTRAST: Iohexol (Omni) 100 mL. COMPARISON: 4/21/2017. FINDINGS: CHEST: The lungs are clear. No pleural effusion. No thoracic lymphadenopathy.  ABDOMEN: Cholecystectomy. Hepatic steatosis. Normal spleen, pancreas, adrenal glands, and kidneys. PELVIS: Partial right hemicolectomy with ileocolic anastomosis. No mass or lymphadenopathy. Normal uterus and adnexa. MUSCULOSKELETAL: Negative.     CONCLUSION: 1.  No evidence of recurrent or metastatic disease in the chest, abdomen, or pelvis. No significant change from previous.      Signed by: Marvin Cummins MD

## 2021-06-17 NOTE — PROGRESS NOTES
Pt ambulatory with family to cancer care for follow up. Further recommendations and orders per provider.

## 2021-06-20 NOTE — PROGRESS NOTES
[facility] Hematology and Oncology Progress Note    Patient: Hazel Johnson  MRN: [id number]  Date of Service: October 10, 2018      Assessment and Plan:    1.  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  Remains normal.  Clinically she is doing well with no evidence of progressive disease.  She is now 3-1/2 years out from her initial surgery.  We will continue to follow with chromogranin A but it has not been proven to be a reliable marker of recurrence in her case.  It has been fluctuating.  We will see her back in 6 months with a repeat scan.    ECOG Performance   ECOG Performance Status: 0    Distress Assessment  Distress Assessment Score: No distress    Pain  Currently in Pain: No/denies    Diagnosis:    1. 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 Tumor was 6.1 cm. Surgical margins were negative. One of 11 nodes was positive with adenocarcinoid. No evidence of metastatic disease on diagnosis.    Treatment:    Initial surgery was an appendectomy on March 4, 2015. Pathology showed adenocarcinoid. She then went back for a right hemicolectomy on April 8, 2015. She did not receive any adjuvant treatment.    Interim History:    Hazel returns for a follow-up visit.  She was last in clinic about 5 months ago.  In the interim she is been doing okay.  She is not having any pain or new symptoms.  Energy and appetite are okay.     Review of Systems:    Constitutional  Constitutional (WDL): All constitutional elements are within defined limits  Neurosensory  Neurosensory (WDL): All neurosensory elements are within defined limits  Cardiovascular  Cardiovascular (WDL): All cardiovascular elements are within defined limits  Pulmonary  Respiratory (WDL): Within Defined Limits  Gastrointestinal  Gastrointestinal (WDL): All gastrointestinal elements are within defined limits  Genitourinary  Genitourinary (WDL): All genitourinary elements are within defined  "limits  Integumentary  Integumentary (WDL): All integumentary elements are within defined limits  Patient Coping  Patient Coping: Accepting  Accompanied by  Accompanied by: Alone    Past History:    Past Medical History:   Diagnosis Date     Appendicitis 03-     Cancer (H)     Appendix     GERD (gastroesophageal reflux disease)      History of gallstones      Neck pain     \"Pinched nerve\"     Pancreatitis     with gallstones     PONV (postoperative nausea and vomiting)     \"Severe nausea & vomiting\"     Physical Exam:    Recent Vitals 10/10/2018   Height -   Weight 181 lbs   BSA (m2) -   /92   Pulse 78   Temp 98.4   Temp src 1   SpO2 95   Some recent data might be hidden     General: patient appears stated age of 53 y.o.. Nontoxic and in no distress.   HEENT: Head: atraumatic, normocephalic. Sclerae anicteric.  Chest:  Normal respiratory effort  Cardiac:  No edema.  Regular rate and rhythm.  No murmur.  Abdomen: abdomen is non-distended  Extremities: normal tone and muscle bulk.  Skin: no lesions or rash. Warm and dry.   CNS: alert and oriented. Grossly non-focal.   Psychiatric: normal mood and affect.     Lab Results:    Recent Results (from the past 168 hour(s))   Comprehensive Metabolic Panel   Result Value Ref Range    Sodium 143 136 - 145 mmol/L    Potassium 3.6 3.5 - 5.0 mmol/L    Chloride 103 98 - 107 mmol/L    CO2 29 22 - 31 mmol/L    Anion Gap, Calculation 11 5 - 18 mmol/L    Glucose 138 (H) 70 - 125 mg/dL    BUN 15 8 - 22 mg/dL    Creatinine 0.79 0.60 - 1.10 mg/dL    GFR MDRD Af Amer >60 >60 mL/min/1.73m2    GFR MDRD Non Af Amer >60 >60 mL/min/1.73m2    Bilirubin, Total 0.3 0.0 - 1.0 mg/dL    Calcium 9.7 8.5 - 10.5 mg/dL    Protein, Total 7.5 6.0 - 8.0 g/dL    Albumin 4.0 3.5 - 5.0 g/dL    Alkaline Phosphatase 70 45 - 120 U/L    AST 17 0 - 40 U/L    ALT 23 0 - 45 U/L   HM1 (CBC with Diff)   Result Value Ref Range    WBC 4.1 4.0 - 11.0 thou/uL    RBC 4.52 3.80 - 5.40 mill/uL    Hemoglobin 15.0 " 12.0 - 16.0 g/dL    Hematocrit 42.0 35.0 - 47.0 %    MCV 93 80 - 100 fL    MCH 33.2 27.0 - 34.0 pg    MCHC 35.7 32.0 - 36.0 g/dL    RDW 13.2 11.0 - 14.5 %    Platelets 136 (L) 140 - 440 thou/uL    MPV 12.1 8.5 - 12.5 fL    Neutrophils % 51 50 - 70 %    Lymphocytes % 39 20 - 40 %    Monocytes % 7 2 - 10 %    Eosinophils % 3 0 - 6 %    Basophils % 1 0 - 2 %    Neutrophils Absolute 2.1 2.0 - 7.7 thou/uL    Lymphocytes Absolute 1.6 0.8 - 4.4 thou/uL    Monocytes Absolute 0.3 0.0 - 0.9 thou/uL    Eosinophils Absolute 0.1 0.0 - 0.4 thou/uL    Basophils Absolute 0.0 0.0 - 0.2 thou/uL     Imaging:    We reviewed her CT scan images.  No evidence of recurrence.    CT CHEST, ABDOMEN, AND PELVIS  10/8/2018 11:33 AM  COMPARISON: PET/CT 03/09/2018, CT exams 03/01/2018, 11/1/2017 and 7/11/2016     FINDINGS:   CHEST: The central airways are clear. Bilateral dependent atelectasis. No focal pneumonic consolidation or pleural effusion. There is a stable benign 3 mm left lower lobe nodule image 81, unchanged since 2016. No new pulmonary nodule.     No thoracic adenopathy. Normal heart size and no pericardial effusion.      ABDOMEN: Hepatic steatosis. Stable focal fatty change in liver segment 4. Postcholecystectomy. Stable biliary ductal dilatation. Unremarkable spleen, pancreas and adrenals. Unremarkable kidneys without evidence of hydronephrosis.     Unremarkable stomach. Normal caliber small bowel. Post surgical changes at the proximal colon. Post appendectomy. Tortuous normal caliber colon. Fat-containing periumbilical hernia. Postsurgical changes at the lower abdominal wall. No free fluid or   adenopathy.     PELVIS: Unremarkable pelvic organs. Dilated tortuous left adnexal vessels which may reflect pelvic venous congestion syndrome in the appropriate clinical setting setting. A dilated left gonadal vein is also noted. No free fluid or adenopathy.     MUSCULOSKELETAL: Multilevel degenerative changes in the  spine.    CONCLUSION:  1.  Stable exam with no CT evidence of metastatic disease in the chest, abdomen or pelvis.  2.  Stable postoperative findings.      Signed by: Marvin Cummins MD

## 2021-07-03 NOTE — ADDENDUM NOTE
Addendum Note by Adair Cummins MD at 5/1/2017  3:48 PM     Author: Adair Cummins MD Service: -- Author Type: Physician    Filed: 5/1/2017  3:48 PM Encounter Date: 5/1/2017 Status: Signed    : Adair Cummins MD (Physician)    Addended by: ADAIR CUMMINS on: 5/1/2017 03:48 PM        Modules accepted: Orders

## 2021-07-03 NOTE — ADDENDUM NOTE
Addendum Note by Miriam Shepard CNP at 12/4/2019 12:45 PM     Author: Miriam Shepard CNP Service: -- Author Type: Nurse Practitioner    Filed: 12/4/2019  2:13 PM Encounter Date: 12/4/2019 Status: Signed    : Miriam Shepard CNP (Nurse Practitioner)    Addended by: MIRIAM SHEPARD on: 12/4/2019 02:13 PM        Modules accepted: Orders

## 2021-07-10 ENCOUNTER — HEALTH MAINTENANCE LETTER (OUTPATIENT)
Age: 56
End: 2021-07-10

## 2021-08-23 ENCOUNTER — TELEPHONE (OUTPATIENT)
Dept: ONCOLOGY | Facility: HOSPITAL | Age: 56
End: 2021-08-23

## 2021-09-04 ENCOUNTER — HEALTH MAINTENANCE LETTER (OUTPATIENT)
Age: 56
End: 2021-09-04

## 2021-09-27 ENCOUNTER — HOSPITAL ENCOUNTER (OUTPATIENT)
Dept: CT IMAGING | Facility: HOSPITAL | Age: 56
Setting detail: RADIATION/ONCOLOGY SERIES
End: 2021-09-27
Attending: INTERNAL MEDICINE
Payer: COMMERCIAL

## 2021-09-27 DIAGNOSIS — C80.1 ADENOCARCINOID TUMOR (H): ICD-10-CM

## 2021-09-27 PROCEDURE — 250N000011 HC RX IP 250 OP 636: Performed by: INTERNAL MEDICINE

## 2021-09-27 PROCEDURE — 74177 CT ABD & PELVIS W/CONTRAST: CPT

## 2021-09-27 RX ORDER — IOPAMIDOL 755 MG/ML
100 INJECTION, SOLUTION INTRAVASCULAR ONCE
Status: COMPLETED | OUTPATIENT
Start: 2021-09-27 | End: 2021-09-27

## 2021-09-27 RX ADMIN — IOPAMIDOL 100 ML: 755 INJECTION, SOLUTION INTRAVENOUS at 12:06

## 2021-10-01 ENCOUNTER — LAB (OUTPATIENT)
Dept: INFUSION THERAPY | Facility: HOSPITAL | Age: 56
End: 2021-10-01
Attending: INTERNAL MEDICINE
Payer: COMMERCIAL

## 2021-10-01 ENCOUNTER — ONCOLOGY VISIT (OUTPATIENT)
Dept: ONCOLOGY | Facility: HOSPITAL | Age: 56
End: 2021-10-01
Attending: INTERNAL MEDICINE
Payer: COMMERCIAL

## 2021-10-01 VITALS
HEART RATE: 65 BPM | SYSTOLIC BLOOD PRESSURE: 136 MMHG | WEIGHT: 180 LBS | BODY MASS INDEX: 30.9 KG/M2 | DIASTOLIC BLOOD PRESSURE: 70 MMHG | TEMPERATURE: 98.6 F | OXYGEN SATURATION: 98 % | RESPIRATION RATE: 18 BRPM

## 2021-10-01 DIAGNOSIS — C80.1 ADENOCARCINOID TUMOR (H): Primary | ICD-10-CM

## 2021-10-01 DIAGNOSIS — C80.1 ADENOCARCINOID TUMOR (H): ICD-10-CM

## 2021-10-01 LAB
ALBUMIN SERPL-MCNC: 3.9 G/DL (ref 3.5–5)
ALP SERPL-CCNC: 71 U/L (ref 45–120)
ALT SERPL W P-5'-P-CCNC: 165 U/L (ref 0–45)
ANION GAP SERPL CALCULATED.3IONS-SCNC: 8 MMOL/L (ref 5–18)
AST SERPL W P-5'-P-CCNC: 55 U/L (ref 0–40)
BASOPHILS # BLD AUTO: 0.1 10E3/UL (ref 0–0.2)
BASOPHILS NFR BLD AUTO: 1 %
BILIRUB SERPL-MCNC: 0.4 MG/DL (ref 0–1)
BUN SERPL-MCNC: 23 MG/DL (ref 8–22)
CALCIUM SERPL-MCNC: 9.9 MG/DL (ref 8.5–10.5)
CHLORIDE BLD-SCNC: 106 MMOL/L (ref 98–107)
CO2 SERPL-SCNC: 26 MMOL/L (ref 22–31)
CREAT SERPL-MCNC: 0.82 MG/DL (ref 0.6–1.1)
EOSINOPHIL # BLD AUTO: 0.2 10E3/UL (ref 0–0.7)
EOSINOPHIL NFR BLD AUTO: 4 %
ERYTHROCYTE [DISTWIDTH] IN BLOOD BY AUTOMATED COUNT: 13.8 % (ref 10–15)
GFR SERPL CREATININE-BSD FRML MDRD: 80 ML/MIN/1.73M2
GLUCOSE BLD-MCNC: 85 MG/DL (ref 70–125)
HCT VFR BLD AUTO: 43.7 % (ref 35–47)
HGB BLD-MCNC: 14.4 G/DL (ref 11.7–15.7)
IMM GRANULOCYTES # BLD: 0 10E3/UL
IMM GRANULOCYTES NFR BLD: 1 %
LYMPHOCYTES # BLD AUTO: 1.7 10E3/UL (ref 0.8–5.3)
LYMPHOCYTES NFR BLD AUTO: 28 %
MCH RBC QN AUTO: 31.6 PG (ref 26.5–33)
MCHC RBC AUTO-ENTMCNC: 33 G/DL (ref 31.5–36.5)
MCV RBC AUTO: 96 FL (ref 78–100)
MONOCYTES # BLD AUTO: 0.6 10E3/UL (ref 0–1.3)
MONOCYTES NFR BLD AUTO: 11 %
NEUTROPHILS # BLD AUTO: 3.4 10E3/UL (ref 1.6–8.3)
NEUTROPHILS NFR BLD AUTO: 55 %
NRBC # BLD AUTO: 0 10E3/UL
NRBC BLD AUTO-RTO: 0 /100
PLATELET # BLD AUTO: 159 10E3/UL (ref 150–450)
POTASSIUM BLD-SCNC: 4.4 MMOL/L (ref 3.5–5)
PROT SERPL-MCNC: 7.4 G/DL (ref 6–8)
RBC # BLD AUTO: 4.55 10E6/UL (ref 3.8–5.2)
SODIUM SERPL-SCNC: 140 MMOL/L (ref 136–145)
WBC # BLD AUTO: 6 10E3/UL (ref 4–11)

## 2021-10-01 PROCEDURE — 36415 COLL VENOUS BLD VENIPUNCTURE: CPT

## 2021-10-01 PROCEDURE — 85025 COMPLETE CBC W/AUTO DIFF WBC: CPT

## 2021-10-01 PROCEDURE — G0463 HOSPITAL OUTPT CLINIC VISIT: HCPCS

## 2021-10-01 PROCEDURE — 82040 ASSAY OF SERUM ALBUMIN: CPT

## 2021-10-01 PROCEDURE — 99213 OFFICE O/P EST LOW 20 MIN: CPT | Performed by: INTERNAL MEDICINE

## 2021-10-01 RX ORDER — CHLORAL HYDRATE 500 MG
2 CAPSULE ORAL DAILY
COMMUNITY

## 2021-10-01 RX ORDER — MULTIVITAMIN WITH IRON
1 TABLET ORAL DAILY
COMMUNITY

## 2021-10-01 ASSESSMENT — PAIN SCALES - GENERAL: PAINLEVEL: NO PAIN (0)

## 2021-10-01 NOTE — PROGRESS NOTES
"Oncology Rooming Note    October 1, 2021 10:19 AM   Hazel Johnson is a 56 year old female who presents for:    Chief Complaint   Patient presents with     Oncology Clinic Visit     Adenocarcinoid tumor     Initial Vitals: /70   Pulse 65   Temp 98.6  F (37  C)   Resp 18   Wt 81.6 kg (180 lb)   SpO2 98%   BMI 30.90 kg/m   Estimated body mass index is 30.9 kg/m  as calculated from the following:    Height as of 12/4/19: 1.626 m (5' 4\").    Weight as of this encounter: 81.6 kg (180 lb). Body surface area is 1.92 meters squared.  No Pain (0) Comment: Data Unavailable   No LMP recorded.  Allergies reviewed: Yes  Medications reviewed: Yes    Medications: Medication refills not needed today.  Pharmacy name entered into [facility]:    [facility] [address] [place] [address] ([place], [place] - [address] [facility] [address] [place] [address] - [address] [place] [address]    Clinical concerns: None       Najma Shelton            "

## 2021-10-01 NOTE — PROGRESS NOTES
[facility] Hematology and Oncology Progress Note    Patient: Hazel Johnson  MRN: [id number]  Date of Service: Oct 1, 2021        Assessment and Plan:    Cancer Staging  No matching staging information was found for the patient.    1.  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  There is no evidence of recurrent disease.  Clinically she is doing well.  No change in her health over the past year.  She is 6-1/2 years out from her diagnosis and treatment.  We will continue to follow her yearly until she is 10 years out.  Reviewed with her that likelihood of recurrence is small at this point.    ECOG Performance  0    Diagnosis:    1. 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 Tumor was 6.1 cm. Surgical margins were negative. One of 11 nodes was positive with adenocarcinoid. No evidence of metastatic disease on diagnosis.    Treatment:    Initial surgery was an appendectomy on March 4, 2015. Pathology showed adenocarcinoid.   She then went back for a right hemicolectomy on April 8, 2015. She did not receive any adjuvant treatment.    Interim History:    Hazel returns for a follow-up visit.    She was last seen a year ago.  In the interim she has been doing okay.  No new symptoms.  No nausea, vomiting, or change in bowel or bladder habits.    Review of Systems:    As above in the history.     Review of Systems otherwise Negative for:  General: chills, fever or night sweats  Psychological: anxiety or depression  Ophthalmic: blurry vision, double vision or loss of vision, vision change  ENT: epistaxis, oral lesions, hearing changes  Hematological and Lymphatic: bleeding, bruising, jaundice, swollen lymph nodes  Endocrine: hot flashes, unexpected weight changes  Respiratory: cough, hemoptysis, orthopnea or shortness of breath/PORTER  Cardiovascular: chest pain, edema, palpitations or PND  Gastrointestinal: abdominal pain, blood in stools, change in bowel  "habits, constipation, diarrhea or nausea/vomiting  Genito-Urinary: change in urinary stream, incontinence, frequency/urgency  Musculoskeletal: joint pain, stiffness, swelling, muscle pain  Neurological: dizziness, headaches, numbness/tingling  Dermatological: lumps and rash    Past History:    Past Medical History:   Diagnosis Date     Appendicitis 03-     Cancer (H)     Appendix     GERD (gastroesophageal reflux disease)      History of gallstones      Neck pain     \"Pinched nerve\"     Pancreatitis     with gallstones     PONV (postoperative nausea and vomiting)     \"Severe nausea & vomiting\"     Physical Exam:    /70   Pulse 65   Temp 98.6  F (37  C)   Resp 18   Wt 81.6 kg (180 lb)   SpO2 98%   BMI 30.90 kg/m      General: patient appears stated age of 56 year old. Nontoxic and in no distress.   HEENT: Head: atraumatic, normocephalic. Sclerae anicteric.  Chest:  Normal respiratory effort  Cardiac:  No edema.   Abdomen: abdomen is non-distended  Extremities: normal tone and muscle bulk.  Skin: no lesions or rash on visible skin. Warm and dry.   CNS: alert and oriented. Grossly non-focal.   Psychiatric: normal mood and affect.     Lab Results:    No results found for this or any previous visit (from the past 168 hour(s)).   0  Imaging:    CT Chest/Abdomen/Pelvis w Contrast    Result Date: 9/27/2021  EXAM: CT CHEST/ABDOMEN/PELVIS W CONTRAST LOCATION: [facility] DATE/TIME: 9/27/2021 11:48 AM INDICATION: Follow-up appendiceal carcinoid tumor. COMPARISON: CT CAP 08/10/2020, 12/02/2019, 4/24/2019 and 10/8/2018 TECHNIQUE: CT scan of the chest, abdomen, and pelvis was performed following injection of IV contrast. Multiplanar reformats were obtained. Dose reduction techniques were used. CONTRAST: Isovue-370 100 mL IV FINDINGS: LUNGS AND PLEURA: No new or concerning pulmonary nodules. Stable benign calcified granuloma right upper lobe. A 3 mm left upper lobe nodule and several " diminutive subpleural nodules related to the left major fissure with long-term stability consistent with benign etiology. Lungs are otherwise clear. No pleural effusion. MEDIASTINUM/AXILLAE: No lymphadenopathy. Heart size normal with no pericardial effusion. CORONARY ARTERY CALCIFICATION: No visible coronary artery calcification. HEPATOBILIARY: Mild diffuse hepatic steatosis has decreased in severity since 08/10/2020. Two benign foci of more marked steatosis medial segment adjacent to the falciform ligament of no clinical significance. Prior cholecystectomy. No bile duct dilatation. PANCREAS: Normal. SPLEEN: Normal. ADRENAL GLANDS: Normal. KIDNEYS/BLADDER: Normal. BOWEL: Ileocecal resection with unremarkable right lower quadrant anastomosis. No CT signs of peritoneal disease. Mild colonic diverticulosis with no diverticulitis. LYMPH NODES: No lymphadenopathy. VASCULATURE: Normal caliber abdominal aorta. PELVIC ORGANS: No pelvic mass or fluid. MUSCULOSKELETAL: No suspicious bone lesions. Advanced spondylotic change lumbar spine and moderate degenerative changes of the sacroiliac joints.     IMPRESSION: 1.  Ileocecal resection with unremarkable right lower quadrant anastomosis. 2.  Mild diffuse hepatic steatosis has decreased in severity since 08/10/2020.       Signed by: Marvin Cummins MD

## 2021-10-01 NOTE — LETTER
"    10/1/2021         RE: Hazel Johnson  [address] [place] [address]        Dear Colleague,    Thank you for referring your patient, Hazel Johnson, to the [facility] CANCER [facility]. Please see a copy of my visit note below.    Oncology Rooming Note    October 1, 2021 10:19 AM   Hazel Johnson is a 56 year old female who presents for:    Chief Complaint   Patient presents with     Oncology Clinic Visit     Adenocarcinoid tumor     Initial Vitals: /70   Pulse 65   Temp 98.6  F (37  C)   Resp 18   Wt 81.6 kg (180 lb)   SpO2 98%   BMI 30.90 kg/m   Estimated body mass index is 30.9 kg/m  as calculated from the following:    Height as of 12/4/19: 1.626 m (5' 4\").    Weight as of this encounter: 81.6 kg (180 lb). Body surface area is 1.92 meters squared.  No Pain (0) Comment: Data Unavailable   No LMP recorded.  Allergies reviewed: Yes  Medications reviewed: Yes    Medications: Medication refills not needed today.  Pharmacy name entered into [employer]:    [facility] [address] [place], [place] - [address] [facility] [address]    Clinical concerns: None       Najma Shelton              [facility]ematology and Oncology Progress Note    Patient: Hazel Johnson  MRN: [id number]  Date of Service: Oct 1, 2021        Assessment and Plan:    Cancer Staging  No matching staging information was found for the patient.    1.  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  There is no evidence of recurrent disease.  Clinically she is doing well.  No change in her health over the past year.  She is 6-1/2 years out from her diagnosis and treatment.  We will continue to follow her yearly until she is 10 years out.  Reviewed with her that likelihood of recurrence is small at this point.    ECOG Performance  0    Diagnosis:    1. Adenocarcinoid tumor of the appendix:  Diagnosed " "in March 2015. Tumor was 6.1 cm. Surgical margins were negative. One of 11 nodes was positive with adenocarcinoid. No evidence of metastatic disease on diagnosis.    Treatment:    Initial surgery was an appendectomy on March 4, 2015. Pathology showed adenocarcinoid.   She then went back for a right hemicolectomy on April 8, 2015. She did not receive any adjuvant treatment.    Interim History:    Hazel returns for a follow-up visit.    She was last seen a year ago.  In the interim she has been doing okay.  No new symptoms.  No nausea, vomiting, or change in bowel or bladder habits.    Review of Systems:    As above in the history.     Review of Systems otherwise Negative for:  General: chills, fever or night sweats  Psychological: anxiety or depression  Ophthalmic: blurry vision, double vision or loss of vision, vision change  ENT: epistaxis, oral lesions, hearing changes  Hematological and Lymphatic: bleeding, bruising, jaundice, swollen lymph nodes  Endocrine: hot flashes, unexpected weight changes  Respiratory: cough, hemoptysis, orthopnea or shortness of breath/PORTER  Cardiovascular: chest pain, edema, palpitations or PND  Gastrointestinal: abdominal pain, blood in stools, change in bowel habits, constipation, diarrhea or nausea/vomiting  Genito-Urinary: change in urinary stream, incontinence, frequency/urgency  Musculoskeletal: joint pain, stiffness, swelling, muscle pain  Neurological: dizziness, headaches, numbness/tingling  Dermatological: lumps and rash    Past History:    Past Medical History:   Diagnosis Date     Appendicitis 03-     Cancer (H)     Appendix     GERD (gastroesophageal reflux disease)      History of gallstones      Neck pain     \"Pinched nerve\"     Pancreatitis     with gallstones     PONV (postoperative nausea and vomiting)     \"Severe nausea & vomiting\"     Physical Exam:    /70   Pulse 65   Temp 98.6  F (37  C)   Resp 18   Wt 81.6 kg (180 lb)   SpO2 98%   BMI 30.90 " kg/m      General: patient appears stated age of 56 year old. Nontoxic and in no distress.   HEENT: Head: atraumatic, normocephalic. Sclerae anicteric.  Chest:  Normal respiratory effort  Cardiac:  No edema.   Abdomen: abdomen is non-distended  Extremities: normal tone and muscle bulk.  Skin: no lesions or rash on visible skin. Warm and dry.   CNS: alert and oriented. Grossly non-focal.   Psychiatric: normal mood and affect.     Lab Results:    No results found for this or any previous visit (from the past 168 hour(s)).   0  Imaging:    CT Chest/Abdomen/Pelvis w Contrast    Result Date: 9/27/2021  EXAM: CT CHEST/ABDOMEN/PELVIS W CONTRAST LOCATION: [facility] DATE/TIME: 9/27/2021 11:48 AM INDICATION: Follow-up appendiceal carcinoid tumor. COMPARISON: CT CAP 08/10/2020, 12/02/2019, 4/24/2019 and 10/8/2018 TECHNIQUE: CT scan of the chest, abdomen, and pelvis was performed following injection of IV contrast. Multiplanar reformats were obtained. Dose reduction techniques were used. CONTRAST: Isovue-370 100 mL IV FINDINGS: LUNGS AND PLEURA: No new or concerning pulmonary nodules. Stable benign calcified granuloma right upper lobe. A 3 mm left upper lobe nodule and several diminutive subpleural nodules related to the left major fissure with long-term stability consistent with benign etiology. Lungs are otherwise clear. No pleural effusion. MEDIASTINUM/AXILLAE: No lymphadenopathy. Heart size normal with no pericardial effusion. CORONARY ARTERY CALCIFICATION: No visible coronary artery calcification. HEPATOBILIARY: Mild diffuse hepatic steatosis has decreased in severity since 08/10/2020. Two benign foci of more marked steatosis medial segment adjacent to the falciform ligament of no clinical significance. Prior cholecystectomy. No bile duct dilatation. PANCREAS: Normal. SPLEEN: Normal. ADRENAL GLANDS: Normal. KIDNEYS/BLADDER: Normal. BOWEL: Ileocecal resection with unremarkable right lower quadrant  anastomosis. No CT signs of peritoneal disease. Mild colonic diverticulosis with no diverticulitis. LYMPH NODES: No lymphadenopathy. VASCULATURE: Normal caliber abdominal aorta. PELVIC ORGANS: No pelvic mass or fluid. MUSCULOSKELETAL: No suspicious bone lesions. Advanced spondylotic change lumbar spine and moderate degenerative changes of the sacroiliac joints.     IMPRESSION: 1.  Ileocecal resection with unremarkable right lower quadrant anastomosis. 2.  Mild diffuse hepatic steatosis has decreased in severity since 08/10/2020.       Signed by: Marvin Cummins MD        Again, thank you for allowing me to participate in the care of your patient.        Sincerely,        Marvin Cummins MD

## 2022-04-06 ENCOUNTER — TELEPHONE (OUTPATIENT)
Dept: ONCOLOGY | Facility: HOSPITAL | Age: 57
End: 2022-04-06
Payer: COMMERCIAL

## 2022-04-06 NOTE — LETTER
Hazel [facility]  [address] [place] [address]          April 6, 2022    Dear Hazel:    Our clinic records indicate we have attempted to contact you to schedule a follow up appointment with Dr. Cummins. Unfortunately, we have been unable to reach you. To prevent further delays in your care, please contact our office to schedule your appointment.      Sincerely,     [facility]

## 2022-08-06 ENCOUNTER — HEALTH MAINTENANCE LETTER (OUTPATIENT)
Age: 57
End: 2022-08-06

## 2022-10-16 ENCOUNTER — HEALTH MAINTENANCE LETTER (OUTPATIENT)
Age: 57
End: 2022-10-16

## 2022-11-03 DIAGNOSIS — C80.1 ADENOCARCINOID TUMOR (H): Primary | ICD-10-CM

## 2022-11-25 ENCOUNTER — HOSPITAL ENCOUNTER (OUTPATIENT)
Dept: CT IMAGING | Facility: HOSPITAL | Age: 57
Discharge: HOME | End: 2022-11-25
Attending: INTERNAL MEDICINE | Admitting: INTERNAL MEDICINE
Payer: COMMERCIAL

## 2022-11-25 DIAGNOSIS — C80.1 ADENOCARCINOID TUMOR (H): ICD-10-CM

## 2022-11-25 PROCEDURE — 74177 CT ABD & PELVIS W/CONTRAST: CPT

## 2022-11-25 PROCEDURE — 250N000011 HC RX IP 250 OP 636: Performed by: INTERNAL MEDICINE

## 2022-11-25 RX ORDER — IOPAMIDOL 755 MG/ML
100 INJECTION, SOLUTION INTRAVASCULAR ONCE
Status: COMPLETED | OUTPATIENT
Start: 2022-11-25 | End: 2022-11-25

## 2022-11-25 RX ADMIN — IOPAMIDOL 100 ML: 755 INJECTION, SOLUTION INTRAVENOUS at 11:39

## 2022-11-28 DIAGNOSIS — C80.1 ADENOCARCINOID TUMOR (H): Primary | ICD-10-CM

## 2022-11-29 ENCOUNTER — ONCOLOGY VISIT (OUTPATIENT)
Dept: ONCOLOGY | Facility: HOSPITAL | Age: 57
End: 2022-11-29
Attending: INTERNAL MEDICINE
Payer: COMMERCIAL

## 2022-11-29 ENCOUNTER — LAB (OUTPATIENT)
Dept: INFUSION THERAPY | Facility: HOSPITAL | Age: 57
End: 2022-11-29
Attending: INTERNAL MEDICINE
Payer: COMMERCIAL

## 2022-11-29 VITALS
HEART RATE: 62 BPM | HEIGHT: 63 IN | SYSTOLIC BLOOD PRESSURE: 144 MMHG | WEIGHT: 174 LBS | DIASTOLIC BLOOD PRESSURE: 72 MMHG | RESPIRATION RATE: 22 BRPM | OXYGEN SATURATION: 98 % | BODY MASS INDEX: 30.83 KG/M2 | TEMPERATURE: 98.5 F

## 2022-11-29 DIAGNOSIS — C80.1 ADENOCARCINOID TUMOR (H): ICD-10-CM

## 2022-11-29 DIAGNOSIS — C80.1 ADENOCARCINOID TUMOR (H): Primary | ICD-10-CM

## 2022-11-29 LAB
ALBUMIN SERPL BCG-MCNC: 4.4 G/DL (ref 3.5–5.2)
ALP SERPL-CCNC: 77 U/L (ref 35–104)
ALT SERPL W P-5'-P-CCNC: 23 U/L (ref 10–35)
ANION GAP SERPL CALCULATED.3IONS-SCNC: 11 MMOL/L (ref 7–15)
AST SERPL W P-5'-P-CCNC: 22 U/L (ref 10–35)
BASOPHILS # BLD AUTO: 0 10E3/UL (ref 0–0.2)
BASOPHILS NFR BLD AUTO: 1 %
BILIRUB SERPL-MCNC: 0.3 MG/DL
BUN SERPL-MCNC: 26.6 MG/DL (ref 6–20)
CALCIUM SERPL-MCNC: 9.8 MG/DL (ref 8.6–10)
CHLORIDE SERPL-SCNC: 103 MMOL/L (ref 98–107)
CREAT SERPL-MCNC: 0.75 MG/DL (ref 0.51–0.95)
DEPRECATED HCO3 PLAS-SCNC: 30 MMOL/L (ref 22–29)
EOSINOPHIL # BLD AUTO: 0.3 10E3/UL (ref 0–0.7)
EOSINOPHIL NFR BLD AUTO: 5 %
ERYTHROCYTE [DISTWIDTH] IN BLOOD BY AUTOMATED COUNT: 13.3 % (ref 10–15)
GFR SERPL CREATININE-BSD FRML MDRD: >90 ML/MIN/1.73M2
GLUCOSE SERPL-MCNC: 103 MG/DL (ref 70–99)
HCT VFR BLD AUTO: 42.7 % (ref 35–47)
HGB BLD-MCNC: 14.4 G/DL (ref 11.7–15.7)
IMM GRANULOCYTES # BLD: 0 10E3/UL
IMM GRANULOCYTES NFR BLD: 0 %
LYMPHOCYTES # BLD AUTO: 1.5 10E3/UL (ref 0.8–5.3)
LYMPHOCYTES NFR BLD AUTO: 29 %
MCH RBC QN AUTO: 31.8 PG (ref 26.5–33)
MCHC RBC AUTO-ENTMCNC: 33.7 G/DL (ref 31.5–36.5)
MCV RBC AUTO: 94 FL (ref 78–100)
MONOCYTES # BLD AUTO: 0.6 10E3/UL (ref 0–1.3)
MONOCYTES NFR BLD AUTO: 11 %
NEUTROPHILS # BLD AUTO: 2.9 10E3/UL (ref 1.6–8.3)
NEUTROPHILS NFR BLD AUTO: 54 %
NRBC # BLD AUTO: 0 10E3/UL
NRBC BLD AUTO-RTO: 0 /100
PLATELET # BLD AUTO: 178 10E3/UL (ref 150–450)
POTASSIUM SERPL-SCNC: 4.4 MMOL/L (ref 3.4–5.3)
PROT SERPL-MCNC: 7.4 G/DL (ref 6.4–8.3)
RBC # BLD AUTO: 4.53 10E6/UL (ref 3.8–5.2)
SODIUM SERPL-SCNC: 144 MMOL/L (ref 136–145)
WBC # BLD AUTO: 5.3 10E3/UL (ref 4–11)

## 2022-11-29 PROCEDURE — 85025 COMPLETE CBC W/AUTO DIFF WBC: CPT

## 2022-11-29 PROCEDURE — 36415 COLL VENOUS BLD VENIPUNCTURE: CPT

## 2022-11-29 PROCEDURE — 99214 OFFICE O/P EST MOD 30 MIN: CPT | Performed by: INTERNAL MEDICINE

## 2022-11-29 PROCEDURE — 80053 COMPREHEN METABOLIC PANEL: CPT

## 2022-11-29 PROCEDURE — G0463 HOSPITAL OUTPT CLINIC VISIT: HCPCS

## 2022-11-29 ASSESSMENT — PAIN SCALES - GENERAL: PAINLEVEL: NO PAIN (0)

## 2022-11-29 NOTE — LETTER
"    11/29/2022         RE: Hazel Johnson  [address]  [place] [address]        Dear Colleague,    Thank you for referring your patient, Hazel Johnson, to the [facility] CANCER [facility]. Please see a copy of my visit note below.    Oncology Rooming Note    November 29, 2022 1:44 PM   Hazel Johnson is a 57 year old female who presents for:    Chief Complaint   Patient presents with     Oncology Clinic Visit     1 year return Adenocarcinoid tumor of the appendix:,  review labs & CT     Initial Vitals: BP (!) 144/72 (BP Location: Right arm, Patient Position: Sitting, Cuff Size: Adult Regular)   Pulse 62   Temp 98.5  F (36.9  C) (Oral)   Resp 22   Ht 1.594 m (5' 2.75\")   Wt 78.9 kg (174 lb)   SpO2 98%   BMI 31.07 kg/m   Estimated body mass index is 31.07 kg/m  as calculated from the following:    Height as of this encounter: 1.594 m (5' 2.75\").    Weight as of this encounter: 78.9 kg (174 lb). Body surface area is 1.87 meters squared.  No Pain (0) Comment: Data Unavailable   No LMP recorded.  Allergies reviewed: Yes  Medications reviewed: Yes    Medications: Medication refills not needed today.  Pharmacy name entered into [employer]:      [facility] [address] [place] [address] - [address] [place] [address]    Clinical concerns: 1 year return Adenocarcinoid tumor of the appendix:,  review labs & CT      Jeanna Pittman [facility] Hematology and Oncology Progress Note    Patient: Hazel Johnson  MRN: [id number]  Date of Service: Nov 29, 2022        Assessment and Plan:    1.  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  There is no evidence of recurrent cancer. She does have some degenerative changes of the spine which we reviewed.  Clinically she is doing well.  She is now about 7-1/2 years out from diagnosis.  I think her recurrence risk is quite low.  We will now see her back in 2 years time with " repeat imaging.  She will let us know in the interim if she develops any new abdominal symptoms or other concerning symptoms.  She had labs drawn today including a CBC which I personally reviewed.  This is normal with a white count of 5.3, hemoglobin 14.4, and platelets 178,000.    ECOG Performance  0    Diagnosis:    1. 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 Tumor was 6.1 cm. Surgical margins were negative. One of 11 nodes was positive with adenocarcinoid. No evidence of metastatic disease on diagnosis.    Treatment:    Initial surgery was an appendectomy on March 4, 2015. Pathology showed adenocarcinoid.   She then went back for a right hemicolectomy on April 8, 2015. She did not receive any adjuvant treatment.    Interim History:    Hazel returns for a follow-up visit.    She was last seen a year ago.  In the interim she has been doing okay.  No new symptoms.  No nausea, vomiting, or change in bowel or bladder habits.    Review of Systems:    As above in the history.     Review of Systems otherwise Negative for:  General: chills, fever or night sweats  Psychological: anxiety or depression  Ophthalmic: blurry vision, double vision or loss of vision, vision change  ENT: epistaxis, oral lesions, hearing changes  Hematological and Lymphatic: bleeding, bruising, jaundice, swollen lymph nodes  Endocrine: hot flashes, unexpected weight changes  Respiratory: cough, hemoptysis, orthopnea or shortness of breath/PORTER  Cardiovascular: chest pain, edema, palpitations or PND  Gastrointestinal: abdominal pain, blood in stools, change in bowel habits, constipation, diarrhea or nausea/vomiting  Genito-Urinary: change in urinary stream, incontinence, frequency/urgency  Musculoskeletal: joint pain, stiffness, swelling, muscle pain  Neurological: dizziness, headaches, numbness/tingling  Dermatological: lumps and rash    Past History:    Past Medical History:   Diagnosis Date     Appendicitis 03-  "    Cancer (H)     Appendix     GERD (gastroesophageal reflux disease)      History of gallstones      Neck pain     \"Pinched nerve\"     Pancreatitis     with gallstones     PONV (postoperative nausea and vomiting)     \"Severe nausea & vomiting\"     Physical Exam:    BP (!) 144/72 (BP Location: Right arm, Patient Position: Sitting, Cuff Size: Adult Regular)   Pulse 62   Temp 98.5  F (36.9  C) (Oral)   Resp 22   Ht 1.594 m (5' 2.75\")   Wt 78.9 kg (174 lb)   SpO2 98%   BMI 31.07 kg/m      General: patient appears stated age of 56 year old. Nontoxic and in no distress.   HEENT: Head: atraumatic, normocephalic. Sclerae anicteric.  Chest:  Normal respiratory effort  Cardiac:  No edema.   Abdomen: abdomen is non-distended  Extremities: normal tone and muscle bulk.  Skin: no lesions or rash on visible skin. Warm and dry.   CNS: alert and oriented. Grossly non-focal.   Psychiatric: normal mood and affect.     Lab Results:    Recent Results (from the past 168 hour(s))   Comprehensive metabolic panel   Result Value Ref Range    Sodium 144 136 - 145 mmol/L    Potassium 4.4 3.4 - 5.3 mmol/L    Chloride 103 98 - 107 mmol/L    Carbon Dioxide (CO2) 30 (H) 22 - 29 mmol/L    Anion Gap 11 7 - 15 mmol/L    Urea Nitrogen 26.6 (H) 6.0 - 20.0 mg/dL    Creatinine 0.75 0.51 - 0.95 mg/dL    Calcium 9.8 8.6 - 10.0 mg/dL    Glucose 103 (H) 70 - 99 mg/dL    Alkaline Phosphatase 77 35 - 104 U/L    AST 22 10 - 35 U/L    ALT 23 10 - 35 U/L    Protein Total 7.4 6.4 - 8.3 g/dL    Albumin 4.4 3.5 - 5.2 g/dL    Bilirubin Total 0.3 <=1.2 mg/dL    GFR Estimate >90 >60 mL/min/1.73m2   CBC with platelets and differential   Result Value Ref Range    WBC Count 5.3 4.0 - 11.0 10e3/uL    RBC Count 4.53 3.80 - 5.20 10e6/uL    Hemoglobin 14.4 11.7 - 15.7 g/dL    Hematocrit 42.7 35.0 - 47.0 %    MCV 94 78 - 100 fL    MCH 31.8 26.5 - 33.0 pg    MCHC 33.7 31.5 - 36.5 g/dL    RDW 13.3 10.0 - 15.0 %    Platelet Count 178 150 - 450 10e3/uL    % Neutrophils 54 " %    % Lymphocytes 29 %    % Monocytes 11 %    % Eosinophils 5 %    % Basophils 1 %    % Immature Granulocytes 0 %    NRBCs per 100 WBC 0 <1 /100    Absolute Neutrophils 2.9 1.6 - 8.3 10e3/uL    Absolute Lymphocytes 1.5 0.8 - 5.3 10e3/uL    Absolute Monocytes 0.6 0.0 - 1.3 10e3/uL    Absolute Eosinophils 0.3 0.0 - 0.7 10e3/uL    Absolute Basophils 0.0 0.0 - 0.2 10e3/uL    Absolute Immature Granulocytes 0.0 <=0.4 10e3/uL    Absolute NRBCs 0.0 10e3/uL      0  Imaging:    CT Chest/Abdomen/Pelvis w Contrast    Result Date: 11/25/2022  EXAM: CT CHEST/ABDOMEN/PELVIS W CONTRAST LOCATION: [facility] DATE/TIME: 11/25/2022 11:40 AM INDICATION: Appendiceal carcinoid tumor. Follow-up. COMPARISON: 09/27/2021 TECHNIQUE: CT scan of the chest, abdomen, and pelvis was performed following injection of IV contrast. Multiplanar reformats were obtained. Dose reduction techniques were used. CONTRAST: 100 ml Isovue 370 FINDINGS: LUNGS AND PLEURA: Again seen are several, small benign-appearing pulmonary nodules measuring between 2 and 4 mm in diameter. No new nodules. MEDIASTINUM/AXILLAE: Normal sized mediastinal, hilar and axillary lymph nodes. CORONARY ARTERY CALCIFICATION: None. HEPATOBILIARY: Cholecystectomy clips. 2 areas of focal fatty infiltration near the falciform ligament are unchanged. PANCREAS: Normal. SPLEEN: Normal. ADRENAL GLANDS: Stable, benign-appearing left adrenal calcification. Bilateral adrenals are otherwise negative. KIDNEYS/BLADDER: Normal. BOWEL: Postoperative changes of the ileocecal junction. Normal caliber loops of small bowel. Residual stool throughout a normal caliber colon. LYMPH NODES: Normal. VASCULATURE: Mild calcified atherosclerotic changes of a normal caliber abdominal aorta. PELVIC ORGANS: Mild left adnexal varicosities. 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 MUSCULOSKELETAL: Multilevel degenerative disc disease at the mid thoracic and  throughout the lumbar spine. Stable, chronic mild anterior subluxation of L3 on L4. Degenerative changes lumbar facet joints and bilateral sacroiliac joints.     IMPRESSION: 1.  No evidence for recurrent or metastatic disease. 2.  No significant change since 09/27/2021       Signed by: Marvin Cummins MD        Again, thank you for allowing me to participate in the care of your patient.        Sincerely,        Marvin Cummins MD

## 2022-11-29 NOTE — PROGRESS NOTES
[facility] Hematology and Oncology Progress Note    Patient: Hazel Johnson  MRN: [id number]  Date of Service: Nov 29, 2022        Assessment and Plan:    1.  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  There is no evidence of recurrent cancer. She does have some degenerative changes of the spine which we reviewed.  Clinically she is doing well.  She is now about 7-1/2 years out from diagnosis.  I think her recurrence risk is quite low.  We will now see her back in 2 years time with repeat imaging.  She will let us know in the interim if she develops any new abdominal symptoms or other concerning symptoms.  She had labs drawn today including a CBC which I personally reviewed.  This is normal with a white count of 5.3, hemoglobin 14.4, and platelets 178,000.    ECOG Performance  0    Diagnosis:    1. 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 Tumor was 6.1 cm. Surgical margins were negative. One of 11 nodes was positive with adenocarcinoid. No evidence of metastatic disease on diagnosis.    Treatment:    Initial surgery was an appendectomy on March 4, 2015. Pathology showed adenocarcinoid.   She then went back for a right hemicolectomy on April 8, 2015. She did not receive any adjuvant treatment.    Interim History:    Hazel returns for a follow-up visit.    She was last seen a year ago.  In the interim she has been doing okay.  No new symptoms.  No nausea, vomiting, or change in bowel or bladder habits.    Review of Systems:    As above in the history.     Review of Systems otherwise Negative for:  General: chills, fever or night sweats  Psychological: anxiety or depression  Ophthalmic: blurry vision, double vision or loss of vision, vision change  ENT: epistaxis, oral lesions, hearing changes  Hematological and Lymphatic: bleeding, bruising, jaundice, swollen lymph nodes  Endocrine: hot flashes, unexpected weight  "changes  Respiratory: cough, hemoptysis, orthopnea or shortness of breath/PORTER  Cardiovascular: chest pain, edema, palpitations or PND  Gastrointestinal: abdominal pain, blood in stools, change in bowel habits, constipation, diarrhea or nausea/vomiting  Genito-Urinary: change in urinary stream, incontinence, frequency/urgency  Musculoskeletal: joint pain, stiffness, swelling, muscle pain  Neurological: dizziness, headaches, numbness/tingling  Dermatological: lumps and rash    Past History:    Past Medical History:   Diagnosis Date     Appendicitis 03-     Cancer (H)     Appendix     GERD (gastroesophageal reflux disease)      History of gallstones      Neck pain     \"Pinched nerve\"     Pancreatitis     with gallstones     PONV (postoperative nausea and vomiting)     \"Severe nausea & vomiting\"     Physical Exam:    BP (!) 144/72 (BP Location: Right arm, Patient Position: Sitting, Cuff Size: Adult Regular)   Pulse 62   Temp 98.5  F (36.9  C) (Oral)   Resp 22   Ht 1.594 m (5' 2.75\")   Wt 78.9 kg (174 lb)   SpO2 98%   BMI 31.07 kg/m      General: patient appears stated age of 56 year old. Nontoxic and in no distress.   HEENT: Head: atraumatic, normocephalic. Sclerae anicteric.  Chest:  Normal respiratory effort  Cardiac:  No edema.   Abdomen: abdomen is non-distended  Extremities: normal tone and muscle bulk.  Skin: no lesions or rash on visible skin. Warm and dry.   CNS: alert and oriented. Grossly non-focal.   Psychiatric: normal mood and affect.     Lab Results:    Recent Results (from the past 168 hour(s))   Comprehensive metabolic panel   Result Value Ref Range    Sodium 144 136 - 145 mmol/L    Potassium 4.4 3.4 - 5.3 mmol/L    Chloride 103 98 - 107 mmol/L    Carbon Dioxide (CO2) 30 (H) 22 - 29 mmol/L    Anion Gap 11 7 - 15 mmol/L    Urea Nitrogen 26.6 (H) 6.0 - 20.0 mg/dL    Creatinine 0.75 0.51 - 0.95 mg/dL    Calcium 9.8 8.6 - 10.0 mg/dL    Glucose 103 (H) 70 - 99 mg/dL    Alkaline Phosphatase 77 35 " - 104 U/L    AST 22 10 - 35 U/L    ALT 23 10 - 35 U/L    Protein Total 7.4 6.4 - 8.3 g/dL    Albumin 4.4 3.5 - 5.2 g/dL    Bilirubin Total 0.3 <=1.2 mg/dL    GFR Estimate >90 >60 mL/min/1.73m2   CBC with platelets and differential   Result Value Ref Range    WBC Count 5.3 4.0 - 11.0 10e3/uL    RBC Count 4.53 3.80 - 5.20 10e6/uL    Hemoglobin 14.4 11.7 - 15.7 g/dL    Hematocrit 42.7 35.0 - 47.0 %    MCV 94 78 - 100 fL    MCH 31.8 26.5 - 33.0 pg    MCHC 33.7 31.5 - 36.5 g/dL    RDW 13.3 10.0 - 15.0 %    Platelet Count 178 150 - 450 10e3/uL    % Neutrophils 54 %    % Lymphocytes 29 %    % Monocytes 11 %    % Eosinophils 5 %    % Basophils 1 %    % Immature Granulocytes 0 %    NRBCs per 100 WBC 0 <1 /100    Absolute Neutrophils 2.9 1.6 - 8.3 10e3/uL    Absolute Lymphocytes 1.5 0.8 - 5.3 10e3/uL    Absolute Monocytes 0.6 0.0 - 1.3 10e3/uL    Absolute Eosinophils 0.3 0.0 - 0.7 10e3/uL    Absolute Basophils 0.0 0.0 - 0.2 10e3/uL    Absolute Immature Granulocytes 0.0 <=0.4 10e3/uL    Absolute NRBCs 0.0 10e3/uL      0  Imaging:    CT Chest/Abdomen/Pelvis w Contrast    Result Date: 11/25/2022  EXAM: CT CHEST/ABDOMEN/PELVIS W CONTRAST LOCATION: [facility] DATE/TIME: 11/25/2022 11:40 AM INDICATION: Appendiceal carcinoid tumor. Follow-up. COMPARISON: 09/27/2021 TECHNIQUE: CT scan of the chest, abdomen, and pelvis was performed following injection of IV contrast. Multiplanar reformats were obtained. Dose reduction techniques were used. CONTRAST: 100 ml Isovue 370 FINDINGS: LUNGS AND PLEURA: Again seen are several, small benign-appearing pulmonary nodules measuring between 2 and 4 mm in diameter. No new nodules. MEDIASTINUM/AXILLAE: Normal sized mediastinal, hilar and axillary lymph nodes. CORONARY ARTERY CALCIFICATION: None. HEPATOBILIARY: Cholecystectomy clips. 2 areas of focal fatty infiltration near the falciform ligament are unchanged. PANCREAS: Normal. SPLEEN: Normal. ADRENAL GLANDS: Stable,  benign-appearing left adrenal calcification. Bilateral adrenals are otherwise negative. KIDNEYS/BLADDER: Normal. BOWEL: Postoperative changes of the ileocecal junction. Normal caliber loops of small bowel. Residual stool throughout a normal caliber colon. LYMPH NODES: Normal. VASCULATURE: Mild calcified atherosclerotic changes of a normal caliber abdominal aorta. PELVIC ORGANS: Mild left adnexal varicosities. 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 MUSCULOSKELETAL: Multilevel degenerative disc disease at the mid thoracic and throughout the lumbar spine. Stable, chronic mild anterior subluxation of L3 on L4. Degenerative changes lumbar facet joints and bilateral sacroiliac joints.     IMPRESSION: 1.  No evidence for recurrent or metastatic disease. 2.  No significant change since 09/27/2021       Signed by: Marvin Cummins MD

## 2023-08-26 ENCOUNTER — HEALTH MAINTENANCE LETTER (OUTPATIENT)
Age: 58
End: 2023-08-26

## 2023-11-04 ENCOUNTER — HEALTH MAINTENANCE LETTER (OUTPATIENT)
Age: 58
End: 2023-11-04

## 2024-02-21 NOTE — PROGRESS NOTES
[facility] Hematology and Oncology Progress Note    Patient: Hazel Johnson  MRN: [id number]  Date of Service:       Assessment and Plan:    1.  Adenocarcinoid tumor of the appendix: Recent CT scans and PET scans were normal.  Her tumor marker has been trending up but today it is on the downtrend.  Not quite back to normal.  We will continue observation for now.  Would like to repeat a CT scan in 4 months.  Will check labs as well.  I do not think we need to image the thyroid at this time.  The right lobe is slightly larger but appears homogeneous.  She is now 3 years out from her diagnosis.    2.  Left adnexal mass: Stable. Continue to monitor on imaging.    ECOG Performance   ECOG Performance Status: 0    Distress Assessment  Distress Assessment Score: No distress    Pain  Currently in Pain: No/denies    Diagnosis:    1. 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 Tumor was 6.1 cm. Surgical margins were negative. One of 11 nodes was positive with adenocarcinoid. No evidence of metastatic disease on diagnosis.    Treatment:    Initial surgery was an appendectomy on March 4, 2015. Pathology showed adenocarcinoid. She then went back for a right hemicolectomy on April 8, 2015. She did not receive any adjuvant treatment.    Interim History:    Hazel returns for a follow-up visit.  She was last in clinic 6 months ago.  However we have checked her chromogranin a few times as it has been increasing.  Also did imaging as well.  These were all negative.  Today she is feeling okay.  No complaints.  No abdominal pain, nausea, or vomiting.  Energy and appetite are okay.  No diarrhea or flushing.    Review of Systems:    Constitutional  Constitutional (WDL): Exceptions to WDL  Neurosensory  Neurosensory (WDL): All neurosensory elements are within defined limits  Cardiovascular  Cardiovascular (WDL): All cardiovascular elements are within defined limits  Pulmonary  Respiratory (WDL): Within Defined  Occupational Therapy    Visit Type: treatment  SUBJECTIVE  Patient agreed to participate in therapy this date.  RN in agreement to work with patient for therapy session.  Pt stated:  It still hurts to much to move my leg  Patient / Family Goal: return to previous functional status, maximize function and return home    OBJECTIVE     Cognitive Status   Orientation    - Oriented to: person, place, time and situation  Functional Communication   - Overall Status: within functional limits   - Forms of Communication: verbal  Attention Span    - Attention: intact  Following Direction   - follows all commands and directions consistently  Transition Between Tasks   - transitions without difficulty  Memory   - intact  Safety Awareness/Insight   - intact  Awareness of Deficits   - fully aware of deficits  Verbal Expression   - intact    Vitals:  Stable on 2L nasal canula    Patient Activity Tolerance: 1 to 2 activity to rest      Range of Motion (ROM)   (degrees unless noted; active unless noted; norms in ( ); negative=lacking to 0, positive=beyond 0)  WFL: RAJNI ROSS (generalized weakness s/p MS)        Bed Mobility  Mobility deferred s/p recent transfer to bed w/ RN     Interventions      Additional exercise details: Pt completed PNF D2 flexion/extension in unilateral and bilateral patterns 3 sets of 10 to increase hip, trunk and UE strength, endurance and ROM for carry over to ADLs and transfers.          Education:   - Present and ready to learn: patient  Education provided during session:  - Role of OT  - Results of above outlined education: Verbalizes understanding    ASSESSMENT   Progress: progressing toward goals  Interferring components: decreased activity tolerance and medical status limitations    Discharge needs based on today's assessment:  - Current level of function: significantly below baseline level of function  - Therapy needs at discharge: therapy 5 or more times per week  - Activities of daily living (ADLs)  "Limits  Gastrointestinal  Gastrointestinal (WDL): All gastrointestinal elements are within defined limits  Genitourinary  Genitourinary (WDL): All genitourinary elements are within defined limits  Integumentary  Integumentary (WDL): All integumentary elements are within defined limits  Patient Coping  Patient Coping: Accepting  Accompanied by  Accompanied by: Family Member    Past History:    Past Medical History:   Diagnosis Date     Appendicitis 03-     Cancer     Appendix     GERD (gastroesophageal reflux disease)      History of gallstones      Neck pain     \"Pinched nerve\"     Pancreatitis     with gallstones     PONV (postoperative nausea and vomiting)     \"Severe nausea & vomiting\"     Physical Exam:    Recent Vitals 5/4/2018   Height -   Weight 177 lbs 3 oz   BSA (m2) -   /65   Pulse 62   Temp 98.3   Temp src 1   SpO2 95   Some recent data might be hidden     General: patient appears stated age of 53 y.o.. Nontoxic and in no distress.   HEENT: Head: atraumatic, normocephalic. Sclerae anicteric.  Chest:  Normal respiratory effort  Cardiac:  No edema.   Abdomen: abdomen is non-distended  Extremities: normal tone and muscle bulk.  Skin: no lesions or rash. Warm and dry.   CNS: alert and oriented. Grossly non-focal.   Psychiatric: normal mood and affect.     Lab Results:    Recent Results (from the past 168 hour(s))   Chromogranin A   Result Value Ref Range    Chromogranin A 103 (H) 0 - 95 ng/mL   Comprehensive Metabolic Panel   Result Value Ref Range    Sodium 140 136 - 145 mmol/L    Potassium 4.1 3.5 - 5.0 mmol/L    Chloride 105 98 - 107 mmol/L    CO2 23 22 - 31 mmol/L    Anion Gap, Calculation 12 5 - 18 mmol/L    Glucose 109 70 - 125 mg/dL    BUN 18 8 - 22 mg/dL    Creatinine 0.76 0.60 - 1.10 mg/dL    GFR MDRD Af Amer >60 >60 mL/min/1.73m2    GFR MDRD Non Af Amer >60 >60 mL/min/1.73m2    Bilirubin, Total 0.4 0.0 - 1.0 mg/dL    Calcium 9.5 8.5 - 10.5 mg/dL    Protein, Total 7.8 6.0 - 8.0 g/dL    " requiring support at discharge: bed mobility, bathing, transfers, ambulation, toileting, dressing and grooming  - Instrumental activities of daily living (IADLs) requiring support at discharge: home management and meal preparation  - Impairments that require further therapy intervention: strength, activity tolerance, pain, balance and ROM  AM-PAC  - Prior Level of Function:         Key: MOD A=moderate assistance, IND/MOD I=independent/modified independent  - Generalized Current Level of Function     - Current Self-Cares: 12       Scoring Key= >21 Modified Independent; 20-21 Supervision; 18-19 Minimal assist; 13-17 Moderate assist; 9-12 Max assist; <9 Total assist      PLAN (while hospitalized)  Suggestions for next session as indicated: Transfers, postural strengthening, bathing w/ female assist    OT Frequency: 3-5 x per week      PT/OT Mobility Equipment for Discharge: has ww, power w/c  PT/OT ADL Equipment for Discharge: has tub bench, continue to assess  Agreement to plan and goals: patient agrees with goals and treatment plan      GOALS  Long Term Goals: (to be met by time of discharge from hospital)  Feeding: Patient will complete feeding tasks modified independent.  Status: progressing/ongoing  Grooming: Patient will complete grooming tasks modified independent.  Status: progressing/ongoing  Upper body dressing: Patient will complete upper body dressing modified independent.  Status: progressing/ongoing  Lower body dressing: Patient will complete lower body dressing moderate assist.  Status: progressing/ongoing  Toileting: Patient will complete toileting minimal assist.  Status: progressing/ongoing  Bathing: Patient will complete bathingmoderate assist   Status: progressing/ongoingToilet transfer: Patient will complete toilet transfer with minimal assist.   Status: progressing/ongoing  Tub/shower transfer: Patient will complete tub/shower transfer with minimal assist.   Status: progressing/ongoing  Home  Albumin 4.0 3.5 - 5.0 g/dL    Alkaline Phosphatase 65 45 - 120 U/L    AST 23 0 - 40 U/L    ALT 20 0 - 45 U/L   HM1 (CBC with Diff)   Result Value Ref Range    WBC 5.2 4.0 - 11.0 thou/uL    RBC 4.12 3.80 - 5.40 mill/uL    Hemoglobin 13.6 12.0 - 16.0 g/dL    Hematocrit 38.3 35.0 - 47.0 %    MCV 93 80 - 100 fL    MCH 33.0 27.0 - 34.0 pg    MCHC 35.5 32.0 - 36.0 g/dL    RDW 13.7 11.0 - 14.5 %    Platelets 179 140 - 440 thou/uL    MPV 12.0 8.5 - 12.5 fL    Neutrophils % 57 50 - 70 %    Lymphocytes % 32 20 - 40 %    Monocytes % 9 2 - 10 %    Eosinophils % 1 0 - 6 %    Basophils % 1 0 - 2 %    Neutrophils Absolute 3.0 2.0 - 7.7 thou/uL    Lymphocytes Absolute 1.6 0.8 - 4.4 thou/uL    Monocytes Absolute 0.5 0.0 - 0.9 thou/uL    Eosinophils Absolute 0.1 0.0 - 0.4 thou/uL    Basophils Absolute 0.0 0.0 - 0.2 thou/uL     Imaging:    We reviewed her PET scan images.  No evidence of hypermetabolic lesion.    3/9/2018 10:01 AM     INDICATION: Appendiceal adenocarcinoid tumor, restaging. Elevated tumor marker, increasing chromogranin A level. Subsequent treatment strategy.  TECHNIQUE: Serum glucose level 82 mg/dL. One hour post intravenous administration of 9.1 mCi F-18 FDG, PET imaging was performed from the skull base to the mid thighs utilizing attenuation correction with concurrent axial CT and PET/CT image fusion. Dose   reduction techniques were used.  COMPARISON: CT chest abdomen pelvis 03/01/2018, 04/21/2017 reviewed.     FINDINGS: Normal physiologic FDG uptake. Partial right colectomy with ileocolonic anastomosis. No evidence of local recurrence. No FDG avid adenopathy. No suspicious focal skeletal uptake. No ascites. No adnexal mass. Cholecystectomy. Marked diffuse   hepatic steatosis. Minimal atherosclerotic calcifications. No change from 03/01/2018.    CONCLUSION:  No evidence of FDG avid malignancy.      Signed by: Marvin Cummins MD     setting transfer: Patient will complete home setting transfers with minimal assist.   Status: progressing/ongoing  Home program: patient independent with home program as instructed.   Status: progressing/ongoing    Documented in the chart in the following areas: Assessment/Plan.    Patient at End of Session:   Location: in bed  Safety measures: alarm system in place/re-engaged and call light within reach  Handoff to: nurse      Therapy procedure time and total treatment time can be found documented on the Time Entry flowsheet

## 2024-08-10 ENCOUNTER — HEALTH MAINTENANCE LETTER (OUTPATIENT)
Age: 59
End: 2024-08-10

## 2025-08-16 ENCOUNTER — HEALTH MAINTENANCE LETTER (OUTPATIENT)
Age: 60
End: 2025-08-16